# Patient Record
Sex: FEMALE | Race: WHITE | URBAN - METROPOLITAN AREA
[De-identification: names, ages, dates, MRNs, and addresses within clinical notes are randomized per-mention and may not be internally consistent; named-entity substitution may affect disease eponyms.]

---

## 2023-08-21 LAB — PAP SMEAR, EXTERNAL: NORMAL

## 2023-10-05 ENCOUNTER — OFFICE VISIT (OUTPATIENT)
Dept: INTERNAL MEDICINE CLINIC | Age: 33
End: 2023-10-05
Payer: COMMERCIAL

## 2023-10-05 VITALS
BODY MASS INDEX: 45.08 KG/M2 | HEIGHT: 62 IN | SYSTOLIC BLOOD PRESSURE: 128 MMHG | WEIGHT: 245 LBS | OXYGEN SATURATION: 97 % | HEART RATE: 89 BPM | DIASTOLIC BLOOD PRESSURE: 84 MMHG

## 2023-10-05 DIAGNOSIS — Z23 NEED FOR DIPHTHERIA-TETANUS-PERTUSSIS (TDAP) VACCINE: ICD-10-CM

## 2023-10-05 DIAGNOSIS — R87.610 ASCUS WITH POSITIVE HIGH RISK HPV CERVICAL: ICD-10-CM

## 2023-10-05 DIAGNOSIS — J45.30 MILD PERSISTENT ASTHMA WITHOUT COMPLICATION: ICD-10-CM

## 2023-10-05 DIAGNOSIS — Z00.00 ANNUAL PHYSICAL EXAM: Primary | ICD-10-CM

## 2023-10-05 DIAGNOSIS — R87.810 ASCUS WITH POSITIVE HIGH RISK HPV CERVICAL: ICD-10-CM

## 2023-10-05 PROBLEM — Z87.42 HISTORY OF ABNORMAL CERVICAL PAP SMEAR: Status: RESOLVED | Noted: 2023-10-05 | Resolved: 2023-10-05

## 2023-10-05 PROBLEM — N87.9 CERVICAL DYSPLASIA: Status: RESOLVED | Noted: 2023-10-05 | Resolved: 2023-10-05

## 2023-10-05 PROBLEM — N87.9 CERVICAL DYSPLASIA: Status: ACTIVE | Noted: 2023-10-05

## 2023-10-05 PROBLEM — Z87.42 HISTORY OF ABNORMAL CERVICAL PAP SMEAR: Status: ACTIVE | Noted: 2023-10-05

## 2023-10-05 PROCEDURE — 99385 PREV VISIT NEW AGE 18-39: CPT | Performed by: INTERNAL MEDICINE

## 2023-10-05 PROCEDURE — 90715 TDAP VACCINE 7 YRS/> IM: CPT | Performed by: INTERNAL MEDICINE

## 2023-10-05 PROCEDURE — 90471 IMMUNIZATION ADMIN: CPT | Performed by: INTERNAL MEDICINE

## 2023-10-05 RX ORDER — ALBUTEROL SULFATE 90 UG/1
2 AEROSOL, METERED RESPIRATORY (INHALATION) 4 TIMES DAILY PRN
Qty: 18 G | Refills: 3 | Status: SHIPPED | OUTPATIENT
Start: 2023-10-05

## 2023-10-05 RX ORDER — NORGESTIMATE AND ETHINYL ESTRADIOL 0.25-0.035
KIT ORAL
COMMUNITY
Start: 2023-10-01

## 2023-10-05 SDOH — ECONOMIC STABILITY: INCOME INSECURITY: HOW HARD IS IT FOR YOU TO PAY FOR THE VERY BASICS LIKE FOOD, HOUSING, MEDICAL CARE, AND HEATING?: NOT HARD AT ALL

## 2023-10-05 SDOH — ECONOMIC STABILITY: HOUSING INSECURITY
IN THE LAST 12 MONTHS, WAS THERE A TIME WHEN YOU DID NOT HAVE A STEADY PLACE TO SLEEP OR SLEPT IN A SHELTER (INCLUDING NOW)?: NO

## 2023-10-05 SDOH — ECONOMIC STABILITY: FOOD INSECURITY: WITHIN THE PAST 12 MONTHS, THE FOOD YOU BOUGHT JUST DIDN'T LAST AND YOU DIDN'T HAVE MONEY TO GET MORE.: NEVER TRUE

## 2023-10-05 SDOH — ECONOMIC STABILITY: FOOD INSECURITY: WITHIN THE PAST 12 MONTHS, YOU WORRIED THAT YOUR FOOD WOULD RUN OUT BEFORE YOU GOT MONEY TO BUY MORE.: NEVER TRUE

## 2023-10-05 ASSESSMENT — ENCOUNTER SYMPTOMS
COUGH: 0
WHEEZING: 0
COLOR CHANGE: 0
ABDOMINAL PAIN: 0
CHEST TIGHTNESS: 0
NAUSEA: 0
SHORTNESS OF BREATH: 0
CONSTIPATION: 0
BACK PAIN: 0
VOMITING: 0
SORE THROAT: 0

## 2023-10-05 ASSESSMENT — PATIENT HEALTH QUESTIONNAIRE - PHQ9
SUM OF ALL RESPONSES TO PHQ QUESTIONS 1-9: 0
2. FEELING DOWN, DEPRESSED OR HOPELESS: 0
SUM OF ALL RESPONSES TO PHQ9 QUESTIONS 1 & 2: 0
1. LITTLE INTEREST OR PLEASURE IN DOING THINGS: 0

## 2023-10-05 NOTE — ASSESSMENT & PLAN NOTE
age-related health maintenance and immunization recommendations reviewed and made to patient, she will complete Tdap today, declined flu vaccine  Labs ordered, healthy diet and regular exercise recommendations made to patient  Patient up to date with GYN exam, she is on OCP with regular menstrual cycles, she will follow-up on ASCUS  Depression screen is negative  Follow-up in 1 year and as needed

## 2023-10-05 NOTE — PROGRESS NOTES
ASSESSMENT/PLAN:  1. Annual physical exam  Assessment & Plan:    age-related health maintenance and immunization recommendations reviewed and made to patient, she will complete Tdap today, declined flu vaccine  Labs ordered, healthy diet and regular exercise recommendations made to patient  Patient up to date with GYN exam, she is on OCP with regular menstrual cycles, she will follow-up on ASCUS  Depression screen is negative  Follow-up in 1 year and as needed  Orders:  -     CBC; Future  -     Comprehensive Metabolic Panel; Future  -     Hemoglobin A1C; Future  -     Lipid Panel; Future  -     TSH with Reflex to FT4; Future  -     Tdap, BOOSTRIX, (age 8 yrs+), IM  2. ASCUS with positive high risk HPV cervical  Assessment & Plan:    patient has been following up with GYN and expected to have another colposcopy in the near future. Visits view[facility] through epic, [facility]  3. BMI 40.0-44.9, adult [facility])  Assessment & Plan:    healthy diet, regular exercise and active life recommendations made to patient  4. Need for diphtheria-tetanus-pertussis (Tdap) vaccine  -     Tdap, BOOSTRIX, (age 8 yrs+), IM  5. Mild persistent asthma without complication  Assessment & Plan:    stable and well-controlled on as needed albuterol, continue to avoid smoking known irritants, will refill and continue same. She declined flu vaccine for this visit  Orders:  -     albuterol sulfate HFA (VENTOLIN HFA) 108 (90 Base) MCG/ACT inhaler; Inhale 2 puffs into the lungs 4 times daily as needed for Wheezing, Disp-18 g, R-3Normal      Return in about 1 year (around 10/5/2024) for annual physical.     SUBJECTIVE  HPI:   Patient here to establish new PCP office visits, no previous PCP however she does follow-up with GYN at [facility].   She is a generally healthy 25-year-old female except for history of mild asthma and recently had abnormal Pap smear for which she had colposcopy followed by another test that was still abnormal and is

## 2023-10-05 NOTE — ASSESSMENT & PLAN NOTE
stable and well-controlled on as needed albuterol, continue to avoid smoking known irritants, will refill and continue same.   She declined flu vaccine for this visit

## 2023-10-05 NOTE — ASSESSMENT & PLAN NOTE
patient has been following up with GYN and expected to have another colposcopy in the near future.   Visits viewable through epic, GYN with [facility]

## 2023-10-10 DIAGNOSIS — Z00.00 ANNUAL PHYSICAL EXAM: ICD-10-CM

## 2023-10-10 LAB
ALBUMIN SERPL-MCNC: 4.3 G/DL (ref 3.4–5)
ALBUMIN/GLOB SERPL: 1.7 {RATIO} (ref 1.1–2.2)
ALP SERPL-CCNC: 103 U/L (ref 40–129)
ALT SERPL-CCNC: 16 U/L (ref 10–40)
ANION GAP SERPL CALCULATED.3IONS-SCNC: 12 MMOL/L (ref 3–16)
AST SERPL-CCNC: 16 U/L (ref 15–37)
BILIRUB SERPL-MCNC: 0.4 MG/DL (ref 0–1)
BUN SERPL-MCNC: 9 MG/DL (ref 7–20)
CALCIUM SERPL-MCNC: 9.1 MG/DL (ref 8.3–10.6)
CHLORIDE SERPL-SCNC: 103 MMOL/L (ref 99–110)
CHOLEST SERPL-MCNC: 301 MG/DL (ref 0–199)
CO2 SERPL-SCNC: 25 MMOL/L (ref 21–32)
CREAT SERPL-MCNC: 0.8 MG/DL (ref 0.6–1.1)
DEPRECATED RDW RBC AUTO: 12.6 % (ref 12.4–15.4)
GFR SERPLBLD CREATININE-BSD FMLA CKD-EPI: >60 ML/MIN/{1.73_M2}
GLUCOSE SERPL-MCNC: 100 MG/DL (ref 70–99)
HCT VFR BLD AUTO: 38.9 % (ref 36–48)
HDLC SERPL-MCNC: 58 MG/DL (ref 40–60)
HGB BLD-MCNC: 12.9 G/DL (ref 12–16)
LDLC SERPL CALC-MCNC: 213 MG/DL
MCH RBC QN AUTO: 29.1 PG (ref 26–34)
MCHC RBC AUTO-ENTMCNC: 33.2 G/DL (ref 31–36)
MCV RBC AUTO: 87.5 FL (ref 80–100)
PLATELET # BLD AUTO: 257 K/UL (ref 135–450)
PMV BLD AUTO: 8.7 FL (ref 5–10.5)
POTASSIUM SERPL-SCNC: 4.3 MMOL/L (ref 3.5–5.1)
PROT SERPL-MCNC: 6.9 G/DL (ref 6.4–8.2)
RBC # BLD AUTO: 4.45 M/UL (ref 4–5.2)
SODIUM SERPL-SCNC: 140 MMOL/L (ref 136–145)
TRIGL SERPL-MCNC: 148 MG/DL (ref 0–150)
TSH SERPL DL<=0.005 MIU/L-ACNC: 0.79 UIU/ML (ref 0.27–4.2)
VLDLC SERPL CALC-MCNC: 30 MG/DL
WBC # BLD AUTO: 6.2 K/UL (ref 4–11)

## 2023-10-11 LAB
EST. AVERAGE GLUCOSE BLD GHB EST-MCNC: 105.4 MG/DL
HBA1C MFR BLD: 5.3 %

## 2023-11-04 PROBLEM — Z00.00 ANNUAL PHYSICAL EXAM: Status: RESOLVED | Noted: 2023-10-05 | Resolved: 2023-11-04

## 2023-12-08 ENCOUNTER — TELEMEDICINE (OUTPATIENT)
Dept: INTERNAL MEDICINE CLINIC | Age: 33
End: 2023-12-08

## 2023-12-08 DIAGNOSIS — J02.9 SORE THROAT: ICD-10-CM

## 2023-12-08 DIAGNOSIS — R09.81 NASAL CONGESTION: ICD-10-CM

## 2023-12-08 DIAGNOSIS — R05.1 ACUTE COUGH: Primary | ICD-10-CM

## 2023-12-08 LAB
INFLUENZA A ANTIBODY: NORMAL
INFLUENZA B ANTIBODY: NORMAL
S PYO AG THROAT QL: NORMAL

## 2023-12-08 RX ORDER — METHYLPREDNISOLONE 4 MG/1
TABLET ORAL
Qty: 1 KIT | Refills: 0 | Status: SHIPPED | OUTPATIENT
Start: 2023-12-08 | End: 2023-12-14

## 2023-12-08 ASSESSMENT — ENCOUNTER SYMPTOMS
WHEEZING: 1
SHORTNESS OF BREATH: 0
COUGH: 1
ABDOMINAL PAIN: 0
RHINORRHEA: 1
CONSTIPATION: 0
VOMITING: 0
SINUS PRESSURE: 1
SORE THROAT: 1
TROUBLE SWALLOWING: 0
NAUSEA: 0
DIARRHEA: 0

## 2023-12-08 NOTE — PROGRESS NOTES
TELEHEALTH EVALUATION -- Audio/Visual  Acute Office Visit  12/8/2023    SUBJECTIVE:  Patient ID: Arian Chaves is a 26 y.o. female. Chief Complaint   Patient presents with    Cough     x3d    Headache    Pharyngitis     HPI: The patient presents for an acute visit. Pt reports a cough, wheezing, nasal congestion/drainage, headache and sore throat for the last 3 days. States she took a negative home COVID-19 test.  Denies fevers or chills. Denies N/V, diarrhea or abdominal pain. Denies CP or SOB. History of asthma - states her chest gets tight when she is coughing a lot. Treatment tried and response - inhaler   Recent ill contacts? 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 Tobacco use or second hand smoke exposure - no    No Known Allergies    Current Outpatient Medications   Medication Sig Dispense Refill    methylPREDNISolone (MEDROL DOSEPACK) 4 MG tablet Take by mouth. 1 kit 0    YVES 0.25-35 MG-MCG per tablet       albuterol sulfate HFA (VENTOLIN HFA) 108 (90 Base) MCG/ACT inhaler Inhale 2 puffs into the lungs 4 times daily as needed for Wheezing 18 g 3     No current facility-administered medications for this visit. Review of Systems   Constitutional:  Negative for chills, fatigue and fever. HENT:  Positive for congestion, rhinorrhea, sinus pressure and sore throat. Negative for ear pain, postnasal drip, sneezing and trouble swallowing. Respiratory:  Positive for cough and wheezing. Negative for shortness of breath. Cardiovascular:  Negative for chest pain and palpitations. Gastrointestinal:  Negative for abdominal pain, constipation, diarrhea, nausea and vomiting. Skin:  Negative for pallor. Neurological:  Positive for headaches. Negative for dizziness and light-headedness.      OBJECTIVE:  Video Virtual Visit      12/8/2023    11:41 AM   Patient-Reported Vitals   Patient-Reported Weight 135   Patient-Reported Height 5'2   ^no access to other VS d/t VV per

## 2023-12-09 LAB — SARS-COV-2 N GENE RESP QL NAA+PROBE: NOT DETECTED

## 2024-02-22 DIAGNOSIS — J45.30 MILD PERSISTENT ASTHMA WITHOUT COMPLICATION: ICD-10-CM

## 2024-02-23 RX ORDER — ALBUTEROL SULFATE 90 UG/1
AEROSOL, METERED RESPIRATORY (INHALATION)
Qty: 6.7 G | Refills: 1 | Status: SHIPPED | OUTPATIENT
Start: 2024-02-23

## 2024-02-23 NOTE — TELEPHONE ENCOUNTER
Last OV: 12/8/2023  Next OV: Visit date not found    Next appointment due:around 10/5/2024     Last fill:10/5/23  Refills:3

## 2024-04-21 DIAGNOSIS — J45.30 MILD PERSISTENT ASTHMA WITHOUT COMPLICATION: ICD-10-CM

## 2024-04-22 RX ORDER — ALBUTEROL SULFATE 90 UG/1
AEROSOL, METERED RESPIRATORY (INHALATION)
Qty: 6.7 G | Refills: 1 | Status: SHIPPED | OUTPATIENT
Start: 2024-04-22

## 2024-08-08 ENCOUNTER — OFFICE VISIT (OUTPATIENT)
Dept: INTERNAL MEDICINE CLINIC | Age: 34
End: 2024-08-08
Payer: COMMERCIAL

## 2024-08-08 VITALS
WEIGHT: 245 LBS | DIASTOLIC BLOOD PRESSURE: 85 MMHG | SYSTOLIC BLOOD PRESSURE: 135 MMHG | HEART RATE: 88 BPM | OXYGEN SATURATION: 96 % | BODY MASS INDEX: 44.81 KG/M2

## 2024-08-08 DIAGNOSIS — R03.0 ELEVATED BLOOD PRESSURE READING WITHOUT DIAGNOSIS OF HYPERTENSION: Primary | ICD-10-CM

## 2024-08-08 PROCEDURE — 99213 OFFICE O/P EST LOW 20 MIN: CPT | Performed by: INTERNAL MEDICINE

## 2024-08-08 ASSESSMENT — PATIENT HEALTH QUESTIONNAIRE - PHQ9
SUM OF ALL RESPONSES TO PHQ QUESTIONS 1-9: 0
1. LITTLE INTEREST OR PLEASURE IN DOING THINGS: NOT AT ALL
SUM OF ALL RESPONSES TO PHQ9 QUESTIONS 1 & 2: 0
SUM OF ALL RESPONSES TO PHQ QUESTIONS 1-9: 0
SUM OF ALL RESPONSES TO PHQ QUESTIONS 1-9: 0
2. FEELING DOWN, DEPRESSED OR HOPELESS: NOT AT ALL
SUM OF ALL RESPONSES TO PHQ QUESTIONS 1-9: 0

## 2024-08-08 ASSESSMENT — ENCOUNTER SYMPTOMS
COLOR CHANGE: 0
SHORTNESS OF BREATH: 0
BACK PAIN: 0
CONSTIPATION: 0
CHEST TIGHTNESS: 0
WHEEZING: 0
ABDOMINAL PAIN: 0
COUGH: 0
SORE THROAT: 0
NAUSEA: 0
VOMITING: 0

## 2024-08-08 NOTE — PROGRESS NOTES
throat.    Respiratory:  Negative for cough, chest tightness, shortness of breath and wheezing.    Cardiovascular:  Negative for chest pain, palpitations and leg swelling.   Gastrointestinal:  Negative for abdominal pain, constipation, nausea and vomiting.   Genitourinary:  Negative for difficulty urinating, dysuria, frequency, hematuria and urgency.   Musculoskeletal:  Negative for arthralgias, back pain, gait problem and joint swelling.   Skin:  Negative for color change.   Allergic/Immunologic: Negative for environmental allergies and immunocompromised state.   Neurological:  Negative for dizziness, light-headedness and headaches.   Psychiatric/Behavioral:  Negative for behavioral problems and dysphoric mood.        OBJECTIVE:    /85   Pulse 88   Wt 111.1 kg (245 lb)   SpO2 96%   BMI 44.81 kg/m²    Physical Exam  Vitals and nursing note reviewed.   Constitutional:       General: She is not in acute distress.     Appearance: She is obese. She is not toxic-appearing.   HENT:      Head: Normocephalic.   Cardiovascular:      Rate and Rhythm: Normal rate and regular rhythm.      Heart sounds: Normal heart sounds. No murmur heard.  Pulmonary:      Effort: Pulmonary effort is normal. No respiratory distress.      Breath sounds: No wheezing.   Musculoskeletal:      Cervical back: Normal range of motion and neck supple.      Right lower leg: No edema.      Left lower leg: No edema.   Skin:     General: Skin is warm and dry.   Neurological:      General: No focal deficit present.      Mental Status: She is alert.   Psychiatric:         Mood and Affect: Mood normal.         Behavior: Behavior normal.           Electronically signed by Lexi Moura MD on 8/8/2024 at 9:03 AM.    This dictation was generated by voice recognition computer software.  Although all attempts are made to edit the dictation for accuracy, there may be errors in the transcription that are not intended.

## 2024-08-08 NOTE — ASSESSMENT & PLAN NOTE
recheck this morning upper normal however her home readings are higher.  Advised patient since readings are borderline and not significantly elevated at this point we will hold on starting antihypertensive therapy and observe for another 2 weeks as she just recently discontinued the weight loss medication.  Discussed with patient at length recommendations for lifestyle modification changes to help control blood pressure, encouraged salt restriction, healthy diet and active lifestyle, recommended 30 minutes of cardio at least 3 times a week, continue abstinence from smoking alcohol and reduce caffeine intake, avoid stimulants as energy drinks or weight loss medications.  Patient voiced understanding, will continue ambulatory blood pressure checks for the next 2 weeks and send the readings through [facility], if remains elevated then will probably start antihypertensive therapy, she is scheduled to have her complete physical in the near future

## 2024-08-30 ENCOUNTER — PATIENT MESSAGE (OUTPATIENT)
Dept: INTERNAL MEDICINE CLINIC | Age: 34
End: 2024-08-30

## 2024-08-30 DIAGNOSIS — I10 PRIMARY HYPERTENSION: Primary | ICD-10-CM

## 2024-09-03 RX ORDER — LISINOPRIL 10 MG/1
10 TABLET ORAL DAILY
Qty: 90 TABLET | Refills: 1 | Status: SHIPPED | OUTPATIENT
Start: 2024-09-03

## 2024-09-03 NOTE — TELEPHONE ENCOUNTER
Blood pressure readings remain borderline elevated specially for diastolic higher than target goal for her age, will start lisinopril 10 mg daily, keep upcoming appointment in 4 weeks to update labs and follow-up on blood pressure reading following starting medication.  Order sent to her pharmacy

## 2024-10-07 ENCOUNTER — OFFICE VISIT (OUTPATIENT)
Dept: INTERNAL MEDICINE CLINIC | Age: 34
End: 2024-10-07
Payer: COMMERCIAL

## 2024-10-07 VITALS
DIASTOLIC BLOOD PRESSURE: 80 MMHG | HEART RATE: 95 BPM | HEIGHT: 62 IN | WEIGHT: 245.4 LBS | SYSTOLIC BLOOD PRESSURE: 120 MMHG | OXYGEN SATURATION: 99 % | BODY MASS INDEX: 45.16 KG/M2

## 2024-10-07 DIAGNOSIS — Z23 NEED FOR PNEUMOCOCCAL VACCINATION: ICD-10-CM

## 2024-10-07 DIAGNOSIS — Z00.00 ANNUAL PHYSICAL EXAM: Primary | ICD-10-CM

## 2024-10-07 DIAGNOSIS — I10 PRIMARY HYPERTENSION: ICD-10-CM

## 2024-10-07 DIAGNOSIS — R87.610 ASCUS WITH POSITIVE HIGH RISK HPV CERVICAL: ICD-10-CM

## 2024-10-07 DIAGNOSIS — J45.30 MILD PERSISTENT ASTHMA WITHOUT COMPLICATION: ICD-10-CM

## 2024-10-07 DIAGNOSIS — R87.810 ASCUS WITH POSITIVE HIGH RISK HPV CERVICAL: ICD-10-CM

## 2024-10-07 PROBLEM — R03.0 ELEVATED BLOOD PRESSURE READING WITHOUT DIAGNOSIS OF HYPERTENSION: Status: RESOLVED | Noted: 2024-08-08 | Resolved: 2024-10-07

## 2024-10-07 PROCEDURE — G8484 FLU IMMUNIZE NO ADMIN: HCPCS | Performed by: INTERNAL MEDICINE

## 2024-10-07 PROCEDURE — G8417 CALC BMI ABV UP PARAM F/U: HCPCS | Performed by: INTERNAL MEDICINE

## 2024-10-07 PROCEDURE — 99395 PREV VISIT EST AGE 18-39: CPT | Performed by: INTERNAL MEDICINE

## 2024-10-07 PROCEDURE — 1036F TOBACCO NON-USER: CPT | Performed by: INTERNAL MEDICINE

## 2024-10-07 PROCEDURE — 90471 IMMUNIZATION ADMIN: CPT | Performed by: INTERNAL MEDICINE

## 2024-10-07 PROCEDURE — 99214 OFFICE O/P EST MOD 30 MIN: CPT | Performed by: INTERNAL MEDICINE

## 2024-10-07 PROCEDURE — 3074F SYST BP LT 130 MM HG: CPT | Performed by: INTERNAL MEDICINE

## 2024-10-07 PROCEDURE — 3079F DIAST BP 80-89 MM HG: CPT | Performed by: INTERNAL MEDICINE

## 2024-10-07 PROCEDURE — G8427 DOCREV CUR MEDS BY ELIG CLIN: HCPCS | Performed by: INTERNAL MEDICINE

## 2024-10-07 PROCEDURE — 90677 PCV20 VACCINE IM: CPT | Performed by: INTERNAL MEDICINE

## 2024-10-07 RX ORDER — LISINOPRIL 10 MG/1
10 TABLET ORAL DAILY
Qty: 90 TABLET | Refills: 1 | Status: SHIPPED | OUTPATIENT
Start: 2024-10-07

## 2024-10-07 SDOH — ECONOMIC STABILITY: FOOD INSECURITY: WITHIN THE PAST 12 MONTHS, YOU WORRIED THAT YOUR FOOD WOULD RUN OUT BEFORE YOU GOT MONEY TO BUY MORE.: NEVER TRUE

## 2024-10-07 SDOH — ECONOMIC STABILITY: FOOD INSECURITY: WITHIN THE PAST 12 MONTHS, THE FOOD YOU BOUGHT JUST DIDN'T LAST AND YOU DIDN'T HAVE MONEY TO GET MORE.: NEVER TRUE

## 2024-10-07 SDOH — ECONOMIC STABILITY: INCOME INSECURITY: HOW HARD IS IT FOR YOU TO PAY FOR THE VERY BASICS LIKE FOOD, HOUSING, MEDICAL CARE, AND HEATING?: NOT HARD AT ALL

## 2024-10-07 ASSESSMENT — ENCOUNTER SYMPTOMS
CONSTIPATION: 0
CHEST TIGHTNESS: 0
SORE THROAT: 0
ABDOMINAL PAIN: 0
SINUS PRESSURE: 0
WHEEZING: 1
COUGH: 0
COLOR CHANGE: 0
NAUSEA: 0
BACK PAIN: 0
SHORTNESS OF BREATH: 0
TROUBLE SWALLOWING: 0
VOMITING: 0

## 2024-10-07 NOTE — ASSESSMENT & PLAN NOTE
Patient has prior history of abnormal Pap smears, both mother and sister were diagnosed with cervical cancer, she is scheduled for colposcopy tomorrow, she will continue care and recommendation as per gynecology

## 2024-10-07 NOTE — ASSESSMENT & PLAN NOTE
blood pressure is under control on current dose of lisinopril, advised can go ahead and restart Adipex as long as monitoring of blood pressure.  Reinforced recommendations to continue attempts for healthy low-fat low-carb diet and active lifestyle

## 2024-10-07 NOTE — ASSESSMENT & PLAN NOTE
Although mostly stable has been needing albuterol more frequently lately with the weather changing, she just started daily cetirizine this week, advised if still needing albuterol frequently despite adding daily antihistamine then will need to either try Singulair or start maintenance therapy for the next 6 to 8 weeks with Symbicort or Advair.  Encouraged to reconsider completing flu vaccine, she will complete pneumonia shot this visit, continue to avoid smoking known irritants

## 2024-10-07 NOTE — ASSESSMENT & PLAN NOTE
Blood pressure stable and well-controlled on current dose of lisinopril, she is reporting some fatigue and tiredness since started the medicine however has also been sedentary and deconditioned.  Will update labs today, encouraged to ensure adequate hydration, start gradually increasing level of physical activity until preferably reaches a goal of cardio 30 minutes 3 times a week.  Reevaluate in 3 to 6 months

## 2024-10-07 NOTE — ASSESSMENT & PLAN NOTE
age-related health maintenance and immunization recommendations reviewed and discussed with patient.  She will update pneumonia vaccine today and will consider flu vaccine  Labs ordered, healthy diet and regular exercise recommendations made to patient  Continue abstinence from smoking, alcohol, recreational drug use  Patient up-to-date with GYN exam, she is scheduled for colposcopy tomorrow, see below.  Menstrual cycles regular on OCP  Depression screen is negative

## 2024-10-07 NOTE — PROGRESS NOTES
ASSESSMENT/PLAN:  1. Annual physical exam  Assessment & Plan:    age-related health maintenance and immunization recommendations reviewed and discussed with patient.  She will update pneumonia vaccine today and will consider flu vaccine  Labs ordered, healthy diet and regular exercise recommendations made to patient  Continue abstinence from smoking, alcohol, recreational drug use  Patient up-to-date with GYN exam, she is scheduled for colposcopy tomorrow, see below.  Menstrual cycles regular on OCP  Depression screen is negative  Orders:  -     CBC; Future  -     Comprehensive Metabolic Panel; Future  -     Hemoglobin A1C; Future  -     Lipid Panel; Future  -     TSH with Reflex to FT4; Future  2. Primary hypertension  Assessment & Plan:  Blood pressure stable and well-controlled on current dose of lisinopril, she is reporting some fatigue and tiredness since started the medicine however has also been sedentary and deconditioned.  Will update labs today, encouraged to ensure adequate hydration, start gradually increasing level of physical activity until preferably reaches a goal of cardio 30 minutes 3 times a week.  Reevaluate in 3 to 6 months   Orders:  -     lisinopril (PRINIVIL;ZESTRIL) 10 MG tablet; Take 1 tablet by mouth daily, Disp-90 tablet, R-1Normal  3. Mild persistent asthma without complication  Assessment & Plan:  Although mostly stable has been needing albuterol more frequently lately with the weather changing, she just started daily cetirizine this week, advised if still needing albuterol frequently despite adding daily antihistamine then will need to either try Singulair or start maintenance therapy for the next 6 to 8 weeks with Symbicort or Advair.  Encouraged to reconsider completing flu vaccine, she will complete pneumonia shot this visit, continue to avoid smoking known irritants   Orders:  -     Pneumococcal, PCV20, PREVNAR 20, (age 6w+), IM, PF  4. BMI 40.0-44.9, adult  Assessment & Plan:

## 2024-10-10 DIAGNOSIS — Z00.00 ANNUAL PHYSICAL EXAM: ICD-10-CM

## 2024-10-10 LAB
DEPRECATED RDW RBC AUTO: 13.2 % (ref 12.4–15.4)
HCT VFR BLD AUTO: 37 % (ref 36–48)
HGB BLD-MCNC: 12.4 G/DL (ref 12–16)
MCH RBC QN AUTO: 29.8 PG (ref 26–34)
MCHC RBC AUTO-ENTMCNC: 33.5 G/DL (ref 31–36)
MCV RBC AUTO: 89 FL (ref 80–100)
PLATELET # BLD AUTO: 272 K/UL (ref 135–450)
PMV BLD AUTO: 8.4 FL (ref 5–10.5)
RBC # BLD AUTO: 4.16 M/UL (ref 4–5.2)
WBC # BLD AUTO: 6.6 K/UL (ref 4–11)

## 2024-10-11 LAB
ALBUMIN SERPL-MCNC: 4.1 G/DL (ref 3.4–5)
ALBUMIN/GLOB SERPL: 1.6 {RATIO} (ref 1.1–2.2)
ALP SERPL-CCNC: 90 U/L (ref 40–129)
ALT SERPL-CCNC: 16 U/L (ref 10–40)
ANION GAP SERPL CALCULATED.3IONS-SCNC: 9 MMOL/L (ref 3–16)
AST SERPL-CCNC: 13 U/L (ref 15–37)
BILIRUB SERPL-MCNC: <0.2 MG/DL (ref 0–1)
BUN SERPL-MCNC: 10 MG/DL (ref 7–20)
CALCIUM SERPL-MCNC: 9 MG/DL (ref 8.3–10.6)
CHLORIDE SERPL-SCNC: 103 MMOL/L (ref 99–110)
CHOLEST SERPL-MCNC: 294 MG/DL (ref 0–199)
CO2 SERPL-SCNC: 25 MMOL/L (ref 21–32)
CREAT SERPL-MCNC: 0.7 MG/DL (ref 0.6–1.1)
EST. AVERAGE GLUCOSE BLD GHB EST-MCNC: 108.3 MG/DL
GFR SERPLBLD CREATININE-BSD FMLA CKD-EPI: >90 ML/MIN/{1.73_M2}
GLUCOSE SERPL-MCNC: 94 MG/DL (ref 70–99)
HBA1C MFR BLD: 5.4 %
HDLC SERPL-MCNC: 62 MG/DL (ref 40–60)
LDLC SERPL CALC-MCNC: 190 MG/DL
POTASSIUM SERPL-SCNC: 4.2 MMOL/L (ref 3.5–5.1)
PROT SERPL-MCNC: 6.7 G/DL (ref 6.4–8.2)
SODIUM SERPL-SCNC: 137 MMOL/L (ref 136–145)
TRIGL SERPL-MCNC: 210 MG/DL (ref 0–150)
TSH SERPL DL<=0.005 MIU/L-ACNC: 0.65 UIU/ML (ref 0.27–4.2)
VLDLC SERPL CALC-MCNC: 42 MG/DL

## 2024-10-15 ENCOUNTER — PATIENT MESSAGE (OUTPATIENT)
Dept: INTERNAL MEDICINE CLINIC | Age: 34
End: 2024-10-15

## 2024-10-15 DIAGNOSIS — E78.2 MIXED HYPERLIPIDEMIA: Primary | ICD-10-CM

## 2024-10-15 RX ORDER — ATORVASTATIN CALCIUM 10 MG/1
10 TABLET, FILM COATED ORAL DAILY
Qty: 90 TABLET | Refills: 1 | Status: SHIPPED | OUTPATIENT
Start: 2024-10-15

## 2024-10-29 ENCOUNTER — PATIENT MESSAGE (OUTPATIENT)
Dept: INTERNAL MEDICINE CLINIC | Age: 34
End: 2024-10-29

## 2024-10-31 ENCOUNTER — OFFICE VISIT (OUTPATIENT)
Dept: INTERNAL MEDICINE CLINIC | Age: 34
End: 2024-10-31

## 2024-10-31 VITALS
OXYGEN SATURATION: 98 % | BODY MASS INDEX: 45.34 KG/M2 | WEIGHT: 246.4 LBS | DIASTOLIC BLOOD PRESSURE: 74 MMHG | SYSTOLIC BLOOD PRESSURE: 122 MMHG | HEART RATE: 81 BPM | HEIGHT: 62 IN

## 2024-10-31 DIAGNOSIS — R87.610 ASCUS WITH POSITIVE HIGH RISK HPV CERVICAL: ICD-10-CM

## 2024-10-31 DIAGNOSIS — Z01.818 PRE-OP EVALUATION: ICD-10-CM

## 2024-10-31 DIAGNOSIS — N87.1 CERVICAL DYSPLASIA, MODERATE: ICD-10-CM

## 2024-10-31 DIAGNOSIS — N87.1 CERVICAL DYSPLASIA, MODERATE: Primary | ICD-10-CM

## 2024-10-31 DIAGNOSIS — J45.30 MILD PERSISTENT ASTHMA WITHOUT COMPLICATION: ICD-10-CM

## 2024-10-31 DIAGNOSIS — R87.810 ASCUS WITH POSITIVE HIGH RISK HPV CERVICAL: ICD-10-CM

## 2024-10-31 DIAGNOSIS — I10 PRIMARY HYPERTENSION: ICD-10-CM

## 2024-10-31 LAB
ABO + RH BLD: NORMAL
APTT BLD: 25 SEC (ref 22.1–36.4)
BILIRUB UR QL STRIP.AUTO: NEGATIVE
BLD GP AB SCN SERPL QL: NORMAL
CLARITY UR: CLEAR
COLOR UR: YELLOW
GLUCOSE UR STRIP.AUTO-MCNC: NEGATIVE MG/DL
HGB UR QL STRIP.AUTO: NEGATIVE
INR PPP: 0.97 (ref 0.85–1.15)
KETONES UR STRIP.AUTO-MCNC: NEGATIVE MG/DL
LEUKOCYTE ESTERASE UR QL STRIP.AUTO: NEGATIVE
NITRITE UR QL STRIP.AUTO: NEGATIVE
PH UR STRIP.AUTO: 6 [PH] (ref 5–8)
PROT UR STRIP.AUTO-MCNC: NEGATIVE MG/DL
PROTHROMBIN TIME: 13.1 SEC (ref 11.9–14.9)
SP GR UR STRIP.AUTO: 1.03 (ref 1–1.03)
UA COMPLETE W REFLEX CULTURE PNL UR: NORMAL
UA DIPSTICK W REFLEX MICRO PNL UR: NORMAL
URN SPEC COLLECT METH UR: NORMAL
UROBILINOGEN UR STRIP-ACNC: 0.2 E.U./DL

## 2024-10-31 RX ORDER — CETIRIZINE HYDROCHLORIDE 10 MG/1
10 TABLET ORAL DAILY
COMMUNITY

## 2024-10-31 NOTE — PROGRESS NOTES
Preoperative Consultation      Kelly Brunner  YOB: 1990    Date of Service:  10/31/2024    Vitals:    10/31/24 0938   BP: 122/74   Pulse: 81   SpO2: 98%   Weight: 111.8 kg (246 lb 6.4 oz)   Height: 1.575 m (5' 2\")      Wt Readings from Last 2 Encounters:   10/31/24 111.8 kg (246 lb 6.4 oz)   10/07/24 111.3 kg (245 lb 6.4 oz)     BP Readings from Last 3 Encounters:   10/31/24 122/74   10/07/24 120/80   08/08/24 135/85        Chief Complaint   Patient presents with    Pre-op Exam     Pt is here for a pre op examination for Loop Electrosurgical Excision Procedure on 11/5/24 with Dr. Nath at [facility].      No Known Allergies  Outpatient Medications Marked as Taking for the 10/31/24 encounter (Office Visit) with SHEILA Carrera MD   Medication Sig Dispense Refill    cetirizine (ZYRTEC) 10 MG tablet Take 1 tablet by mouth daily      atorvastatin (LIPITOR) 10 MG tablet Take 1 tablet by mouth daily 90 tablet 1    lisinopril (PRINIVIL;ZESTRIL) 10 MG tablet Take 1 tablet by mouth daily 90 tablet 1    albuterol sulfate HFA (PROVENTIL;VENTOLIN;PROAIR) 108 (90 Base) MCG/ACT inhaler INHALE 2 PUFFS INTO THE LUNGS FOUR TIMES DAILY AS NEEDED FOR WHEEZING 6.7 g 1    YVES 0.25-35 MG-MCG per tablet          This patient presents to the office today for a preoperative consultation at the request of surgeon, Dr.Nikki Nath, who plans on performing LEEP on November 5 at [facility].  The current problem began 2 years ago, and symptoms have been worsening with time.  Conservative therapy: N/A.    Planned anesthesia: General   Known anesthesia problems: None   Bleeding risk: patient report history excessive bleeding during vaginal delivery  Personal or FH of DVT/PE: No    Patient objection to receiving blood products: No  Current Functional Status: METS 3-5  Patient Active Problem List   Diagnosis    ASCUS with positive high risk HPV cervical    BMI 40.0-44.9, adult    Mild persistent asthma without

## 2024-11-06 PROBLEM — Z00.00 ANNUAL PHYSICAL EXAM: Status: RESOLVED | Noted: 2024-10-07 | Resolved: 2024-11-06

## 2025-02-28 ENCOUNTER — TELEMEDICINE (OUTPATIENT)
Dept: INTERNAL MEDICINE CLINIC | Age: 35
End: 2025-02-28
Payer: COMMERCIAL

## 2025-02-28 DIAGNOSIS — J10.1 INFLUENZA A: Primary | ICD-10-CM

## 2025-02-28 DIAGNOSIS — R05.1 ACUTE COUGH: ICD-10-CM

## 2025-02-28 PROCEDURE — G8427 DOCREV CUR MEDS BY ELIG CLIN: HCPCS | Performed by: NURSE PRACTITIONER

## 2025-02-28 PROCEDURE — 1036F TOBACCO NON-USER: CPT | Performed by: NURSE PRACTITIONER

## 2025-02-28 PROCEDURE — G8417 CALC BMI ABV UP PARAM F/U: HCPCS | Performed by: NURSE PRACTITIONER

## 2025-02-28 PROCEDURE — 99213 OFFICE O/P EST LOW 20 MIN: CPT | Performed by: NURSE PRACTITIONER

## 2025-02-28 RX ORDER — BENZONATATE 200 MG/1
200 CAPSULE ORAL 3 TIMES DAILY PRN
Qty: 30 CAPSULE | Refills: 0 | Status: SHIPPED | OUTPATIENT
Start: 2025-02-28 | End: 2025-03-10

## 2025-02-28 RX ORDER — PHENTERMINE HYDROCHLORIDE 37.5 MG/1
37.5 TABLET ORAL
COMMUNITY

## 2025-02-28 RX ORDER — OSELTAMIVIR PHOSPHATE 75 MG/1
75 CAPSULE ORAL 2 TIMES DAILY
Qty: 10 CAPSULE | Refills: 0 | Status: SHIPPED | OUTPATIENT
Start: 2025-02-28 | End: 2025-03-05

## 2025-02-28 SDOH — ECONOMIC STABILITY: FOOD INSECURITY: WITHIN THE PAST 12 MONTHS, THE FOOD YOU BOUGHT JUST DIDN'T LAST AND YOU DIDN'T HAVE MONEY TO GET MORE.: NEVER TRUE

## 2025-02-28 SDOH — ECONOMIC STABILITY: FOOD INSECURITY: WITHIN THE PAST 12 MONTHS, YOU WORRIED THAT YOUR FOOD WOULD RUN OUT BEFORE YOU GOT MONEY TO BUY MORE.: NEVER TRUE

## 2025-02-28 ASSESSMENT — ENCOUNTER SYMPTOMS
COUGH: 1
SHORTNESS OF BREATH: 0
RHINORRHEA: 1
CHEST TIGHTNESS: 1
WHEEZING: 0

## 2025-02-28 ASSESSMENT — PATIENT HEALTH QUESTIONNAIRE - PHQ9
SUM OF ALL RESPONSES TO PHQ QUESTIONS 1-9: 0
SUM OF ALL RESPONSES TO PHQ9 QUESTIONS 1 & 2: 0
2. FEELING DOWN, DEPRESSED OR HOPELESS: NOT AT ALL
1. LITTLE INTEREST OR PLEASURE IN DOING THINGS: NOT AT ALL
SUM OF ALL RESPONSES TO PHQ QUESTIONS 1-9: 0

## 2025-02-28 NOTE — ASSESSMENT & PLAN NOTE
-   Acute, new problem  -   At home test positive for influenza A  -   Start Tamiflu as directed  -   Symptomatic care recommended

## 2025-02-28 NOTE — PROGRESS NOTES
TELEHEALTH EVALUATION -- Audio/Visual    Kelly Brunner, was evaluated through a synchronous (real-time) audio-video encounter. The patient (or guardian if applicable) is aware that this is a billable service, which includes applicable co-pays. This Virtual Visit was conducted with patient's (and/or legal guardian's) consent. Patient identification was verified, and a caregiver was present when appropriate.   The [place] was [place] [address] [place] [address]  Provider was located at Facility (Appt Dept): [address] [place] [id number]  Confirm you are appropriately licensed, registered, or certified to deliver care in the state where the patient is located as indicated above. If you are not or unsure, please re-schedule the visit: Yes, I confirm.      Total time spent for this encounter: Not billed by time    --MARK Walter - NP on 2/28/2025 at 8:59 AM    An electronic signature was used to authenticate this note.    Acute Office Visit  2/28/2025    SUBJECTIVE:    Patient ID: Kelly Brunner is a 34 y.o. female.    Chief Complaint   Patient presents with    Positive for Flu      Positive for flu this morning   Headache, chest congestion, cough, chills, fever of 99F (yesterday)x 1 day      HPI: The patient presents for an acute visit.    The patient is presenting with headache, chest congestion, sore throat and cough, fever started yesterday  At home test positive for influenza A today.  Treatment: Robitussin and Vitamin C    No Known Allergies  Current Outpatient Medications   Medication Sig Dispense Refill    phentermine (ADIPEX-P) 37.5 MG tablet Take 1 tablet by mouth every morning (before breakfast).      oseltamivir (TAMIFLU) 75 MG capsule Take 1 capsule by mouth 2 times daily for 5 days 10 capsule 0    benzonatate (TESSALON) 200 MG capsule Take 1 capsule by mouth 3 times daily as needed for Cough 30 capsule 0    cetirizine (ZYRTEC) 10 MG tablet Take 1 tablet by mouth

## 2025-03-16 ENCOUNTER — APPOINTMENT (OUTPATIENT)
Dept: CT IMAGING | Age: 35
DRG: 247 | End: 2025-03-16
Payer: COMMERCIAL

## 2025-03-16 ENCOUNTER — HOSPITAL ENCOUNTER (INPATIENT)
Age: 35
LOS: 3 days | Discharge: HOME | DRG: 247 | End: 2025-03-19
Attending: EMERGENCY MEDICINE | Admitting: HOSPITALIST
Payer: COMMERCIAL

## 2025-03-16 DIAGNOSIS — Z71.89 GOALS OF CARE, COUNSELING/DISCUSSION: ICD-10-CM

## 2025-03-16 DIAGNOSIS — R82.71 ASB (ASYMPTOMATIC BACTERIURIA): ICD-10-CM

## 2025-03-16 DIAGNOSIS — Z32.02 URINE PREGNANCY TEST NEGATIVE: ICD-10-CM

## 2025-03-16 DIAGNOSIS — K56.600 PARTIAL SMALL BOWEL OBSTRUCTION (HCC): Primary | ICD-10-CM

## 2025-03-16 PROBLEM — K56.609 SBO (SMALL BOWEL OBSTRUCTION) (HCC): Status: ACTIVE | Noted: 2025-03-16

## 2025-03-16 LAB
ALBUMIN SERPL-MCNC: 3.8 G/DL (ref 3.4–5)
ALP SERPL-CCNC: 136 U/L (ref 40–129)
ALT SERPL-CCNC: 15 U/L (ref 10–40)
ANION GAP SERPL CALCULATED.3IONS-SCNC: 11 MMOL/L (ref 3–16)
AST SERPL-CCNC: 17 U/L (ref 15–37)
BACTERIA URNS QL MICRO: ABNORMAL /HPF
BASOPHILS # BLD: 0.1 K/UL (ref 0–0.2)
BASOPHILS NFR BLD: 0.4 %
BILIRUB DIRECT SERPL-MCNC: 0.2 MG/DL (ref 0–0.3)
BILIRUB INDIRECT SERPL-MCNC: 0.4 MG/DL (ref 0–1)
BILIRUB SERPL-MCNC: 0.6 MG/DL (ref 0–1)
BILIRUB UR QL STRIP.AUTO: NEGATIVE
BUN SERPL-MCNC: 10 MG/DL (ref 7–20)
CALCIUM SERPL-MCNC: 8.8 MG/DL (ref 8.3–10.6)
CHLORIDE SERPL-SCNC: 103 MMOL/L (ref 99–110)
CLARITY UR: CLEAR
CO2 SERPL-SCNC: 26 MMOL/L (ref 21–32)
COLOR UR: ABNORMAL
CREAT SERPL-MCNC: 0.8 MG/DL (ref 0.6–1.1)
D-DIMER QUANTITATIVE: >20 UG/ML FEU (ref 0–0.6)
DEPRECATED RDW RBC AUTO: 12.7 % (ref 12.4–15.4)
EOSINOPHIL # BLD: 0.2 K/UL (ref 0–0.6)
EOSINOPHIL NFR BLD: 1.6 %
EPI CELLS #/AREA URNS AUTO: 12 /HPF (ref 0–5)
GFR SERPLBLD CREATININE-BSD FMLA CKD-EPI: >90 ML/MIN/{1.73_M2}
GLUCOSE SERPL-MCNC: 135 MG/DL (ref 70–99)
GLUCOSE UR STRIP.AUTO-MCNC: NEGATIVE MG/DL
HCG UR QL: NEGATIVE
HCT VFR BLD AUTO: 44.5 % (ref 36–48)
HGB BLD-MCNC: 14.6 G/DL (ref 12–16)
HGB UR QL STRIP.AUTO: NEGATIVE
HYALINE CASTS #/AREA URNS AUTO: 2 /LPF (ref 0–8)
KETONES UR STRIP.AUTO-MCNC: ABNORMAL MG/DL
LACTATE BLDV-SCNC: 1.3 MMOL/L (ref 0.4–2)
LEUKOCYTE ESTERASE UR QL STRIP.AUTO: ABNORMAL
LIPASE SERPL-CCNC: 19 U/L (ref 13–60)
LYMPHOCYTES # BLD: 2.4 K/UL (ref 1–5.1)
LYMPHOCYTES NFR BLD: 18 %
MCH RBC QN AUTO: 28.4 PG (ref 26–34)
MCHC RBC AUTO-ENTMCNC: 32.7 G/DL (ref 31–36)
MCV RBC AUTO: 87 FL (ref 80–100)
MONOCYTES # BLD: 0.5 K/UL (ref 0–1.3)
MONOCYTES NFR BLD: 3.7 %
NEUTROPHILS # BLD: 10.4 K/UL (ref 1.7–7.7)
NEUTROPHILS NFR BLD: 76.3 %
NITRITE UR QL STRIP.AUTO: NEGATIVE
PH UR STRIP.AUTO: 6 [PH] (ref 5–8)
PLATELET # BLD AUTO: 487 K/UL (ref 135–450)
PMV BLD AUTO: 8 FL (ref 5–10.5)
POTASSIUM SERPL-SCNC: 3.9 MMOL/L (ref 3.5–5.1)
PROT SERPL-MCNC: 6.9 G/DL (ref 6.4–8.2)
PROT UR STRIP.AUTO-MCNC: ABNORMAL MG/DL
RBC # BLD AUTO: 5.12 M/UL (ref 4–5.2)
RBC CLUMPS #/AREA URNS AUTO: 2 /HPF (ref 0–4)
SODIUM SERPL-SCNC: 140 MMOL/L (ref 136–145)
SP GR UR STRIP.AUTO: 1.03 (ref 1–1.03)
UA COMPLETE W REFLEX CULTURE PNL UR: ABNORMAL
UA DIPSTICK W REFLEX MICRO PNL UR: YES
URN SPEC COLLECT METH UR: ABNORMAL
UROBILINOGEN UR STRIP-ACNC: 1 E.U./DL
WBC # BLD AUTO: 13.6 K/UL (ref 4–11)
WBC #/AREA URNS AUTO: 9 /HPF (ref 0–5)

## 2025-03-16 PROCEDURE — 84703 CHORIONIC GONADOTROPIN ASSAY: CPT

## 2025-03-16 PROCEDURE — 80048 BASIC METABOLIC PNL TOTAL CA: CPT

## 2025-03-16 PROCEDURE — 70498 CT ANGIOGRAPHY NECK: CPT

## 2025-03-16 PROCEDURE — 71250 CT THORAX DX C-: CPT

## 2025-03-16 PROCEDURE — 85379 FIBRIN DEGRADATION QUANT: CPT

## 2025-03-16 PROCEDURE — 2500000003 HC RX 250 WO HCPCS: Performed by: HOSPITALIST

## 2025-03-16 PROCEDURE — 83605 ASSAY OF LACTIC ACID: CPT

## 2025-03-16 PROCEDURE — 6360000004 HC RX CONTRAST MEDICATION: Performed by: NURSE PRACTITIONER

## 2025-03-16 PROCEDURE — 80076 HEPATIC FUNCTION PANEL: CPT

## 2025-03-16 PROCEDURE — 83690 ASSAY OF LIPASE: CPT

## 2025-03-16 PROCEDURE — 36415 COLL VENOUS BLD VENIPUNCTURE: CPT

## 2025-03-16 PROCEDURE — 96374 THER/PROPH/DIAG INJ IV PUSH: CPT

## 2025-03-16 PROCEDURE — 81001 URINALYSIS AUTO W/SCOPE: CPT

## 2025-03-16 PROCEDURE — 74174 CTA ABD&PLVS W/CONTRAST: CPT

## 2025-03-16 PROCEDURE — 85025 COMPLETE CBC W/AUTO DIFF WBC: CPT

## 2025-03-16 PROCEDURE — 99285 EMERGENCY DEPT VISIT HI MDM: CPT

## 2025-03-16 PROCEDURE — 1200000000 HC SEMI PRIVATE

## 2025-03-16 PROCEDURE — 96375 TX/PRO/DX INJ NEW DRUG ADDON: CPT

## 2025-03-16 PROCEDURE — 2580000003 HC RX 258: Performed by: NURSE PRACTITIONER

## 2025-03-16 PROCEDURE — 6360000002 HC RX W HCPCS: Performed by: NURSE PRACTITIONER

## 2025-03-16 RX ORDER — MORPHINE SULFATE 4 MG/ML
4 INJECTION, SOLUTION INTRAMUSCULAR; INTRAVENOUS ONCE
Refills: 0 | Status: COMPLETED | OUTPATIENT
Start: 2025-03-16 | End: 2025-03-16

## 2025-03-16 RX ORDER — NORGESTIMATE AND ETHINYL ESTRADIOL 0.25-0.035
1 KIT ORAL DAILY
COMMUNITY

## 2025-03-16 RX ORDER — IOPAMIDOL 755 MG/ML
75 INJECTION, SOLUTION INTRAVASCULAR
Status: COMPLETED | OUTPATIENT
Start: 2025-03-16 | End: 2025-03-16

## 2025-03-16 RX ORDER — MORPHINE SULFATE 2 MG/ML
2 INJECTION, SOLUTION INTRAMUSCULAR; INTRAVENOUS
Status: DISCONTINUED | OUTPATIENT
Start: 2025-03-16 | End: 2025-03-19 | Stop reason: HOSPADM

## 2025-03-16 RX ORDER — MAGNESIUM SULFATE IN WATER 40 MG/ML
2000 INJECTION, SOLUTION INTRAVENOUS PRN
Status: DISCONTINUED | OUTPATIENT
Start: 2025-03-16 | End: 2025-03-19 | Stop reason: HOSPADM

## 2025-03-16 RX ORDER — ALBUTEROL SULFATE 0.83 MG/ML
2.5 SOLUTION RESPIRATORY (INHALATION) EVERY 6 HOURS PRN
Status: DISCONTINUED | OUTPATIENT
Start: 2025-03-16 | End: 2025-03-19 | Stop reason: HOSPADM

## 2025-03-16 RX ORDER — SODIUM CHLORIDE 9 MG/ML
INJECTION, SOLUTION INTRAVENOUS PRN
Status: DISCONTINUED | OUTPATIENT
Start: 2025-03-16 | End: 2025-03-19 | Stop reason: HOSPADM

## 2025-03-16 RX ORDER — HYDRALAZINE HYDROCHLORIDE 20 MG/ML
5 INJECTION INTRAMUSCULAR; INTRAVENOUS EVERY 4 HOURS PRN
Status: DISCONTINUED | OUTPATIENT
Start: 2025-03-16 | End: 2025-03-19 | Stop reason: HOSPADM

## 2025-03-16 RX ORDER — ONDANSETRON 2 MG/ML
4 INJECTION INTRAMUSCULAR; INTRAVENOUS ONCE
Status: COMPLETED | OUTPATIENT
Start: 2025-03-16 | End: 2025-03-16

## 2025-03-16 RX ORDER — POTASSIUM CHLORIDE 7.45 MG/ML
10 INJECTION INTRAVENOUS PRN
Status: DISCONTINUED | OUTPATIENT
Start: 2025-03-16 | End: 2025-03-19 | Stop reason: HOSPADM

## 2025-03-16 RX ORDER — ACETAMINOPHEN 325 MG/1
650 TABLET ORAL EVERY 6 HOURS PRN
Status: DISCONTINUED | OUTPATIENT
Start: 2025-03-16 | End: 2025-03-19 | Stop reason: HOSPADM

## 2025-03-16 RX ORDER — SODIUM CHLORIDE 0.9 % (FLUSH) 0.9 %
5-40 SYRINGE (ML) INJECTION EVERY 12 HOURS SCHEDULED
Status: DISCONTINUED | OUTPATIENT
Start: 2025-03-16 | End: 2025-03-19 | Stop reason: HOSPADM

## 2025-03-16 RX ORDER — ONDANSETRON 2 MG/ML
4 INJECTION INTRAMUSCULAR; INTRAVENOUS EVERY 6 HOURS PRN
Status: DISCONTINUED | OUTPATIENT
Start: 2025-03-16 | End: 2025-03-19 | Stop reason: HOSPADM

## 2025-03-16 RX ORDER — SODIUM CHLORIDE 9 MG/ML
INJECTION, SOLUTION INTRAVENOUS CONTINUOUS
Status: ACTIVE | OUTPATIENT
Start: 2025-03-16 | End: 2025-03-17

## 2025-03-16 RX ORDER — POTASSIUM CHLORIDE 1500 MG/1
40 TABLET, EXTENDED RELEASE ORAL PRN
Status: DISCONTINUED | OUTPATIENT
Start: 2025-03-16 | End: 2025-03-19 | Stop reason: HOSPADM

## 2025-03-16 RX ORDER — ACETAMINOPHEN 650 MG/1
650 SUPPOSITORY RECTAL EVERY 6 HOURS PRN
Status: DISCONTINUED | OUTPATIENT
Start: 2025-03-16 | End: 2025-03-19 | Stop reason: HOSPADM

## 2025-03-16 RX ORDER — ONDANSETRON 4 MG/1
4 TABLET, ORALLY DISINTEGRATING ORAL EVERY 8 HOURS PRN
Status: DISCONTINUED | OUTPATIENT
Start: 2025-03-16 | End: 2025-03-19 | Stop reason: HOSPADM

## 2025-03-16 RX ORDER — SODIUM CHLORIDE 0.9 % (FLUSH) 0.9 %
5-40 SYRINGE (ML) INJECTION PRN
Status: DISCONTINUED | OUTPATIENT
Start: 2025-03-16 | End: 2025-03-19 | Stop reason: HOSPADM

## 2025-03-16 RX ORDER — MORPHINE SULFATE 4 MG/ML
4 INJECTION, SOLUTION INTRAMUSCULAR; INTRAVENOUS
Status: DISCONTINUED | OUTPATIENT
Start: 2025-03-16 | End: 2025-03-19 | Stop reason: HOSPADM

## 2025-03-16 RX ADMIN — SODIUM CHLORIDE, PRESERVATIVE FREE 10 ML: 5 INJECTION INTRAVENOUS at 23:53

## 2025-03-16 RX ADMIN — IOPAMIDOL 75 ML: 755 INJECTION, SOLUTION INTRAVENOUS at 19:48

## 2025-03-16 RX ADMIN — MORPHINE SULFATE 4 MG: 4 INJECTION, SOLUTION INTRAMUSCULAR; INTRAVENOUS at 22:16

## 2025-03-16 RX ADMIN — ONDANSETRON 4 MG: 2 INJECTION, SOLUTION INTRAMUSCULAR; INTRAVENOUS at 18:01

## 2025-03-16 RX ADMIN — PIPERACILLIN AND TAZOBACTAM 3375 MG: 3; .375 INJECTION, POWDER, LYOPHILIZED, FOR SOLUTION INTRAVENOUS at 22:30

## 2025-03-16 RX ADMIN — MORPHINE SULFATE 4 MG: 4 INJECTION, SOLUTION INTRAMUSCULAR; INTRAVENOUS at 18:01

## 2025-03-16 RX ADMIN — IOPAMIDOL 75 ML: 755 INJECTION, SOLUTION INTRAVENOUS at 19:47

## 2025-03-16 RX ADMIN — ONDANSETRON 4 MG: 2 INJECTION, SOLUTION INTRAMUSCULAR; INTRAVENOUS at 22:16

## 2025-03-16 ASSESSMENT — PAIN SCALES - GENERAL
PAINLEVEL_OUTOF10: 10
PAINLEVEL_OUTOF10: 5
PAINLEVEL_OUTOF10: 8
PAINLEVEL_OUTOF10: 8

## 2025-03-16 ASSESSMENT — PAIN DESCRIPTION - ORIENTATION
ORIENTATION: RIGHT
ORIENTATION: RIGHT
ORIENTATION: RIGHT;LEFT;UPPER;LOWER

## 2025-03-16 ASSESSMENT — PAIN - FUNCTIONAL ASSESSMENT
PAIN_FUNCTIONAL_ASSESSMENT: ACTIVITIES ARE NOT PREVENTED
PAIN_FUNCTIONAL_ASSESSMENT: ACTIVITIES ARE NOT PREVENTED
PAIN_FUNCTIONAL_ASSESSMENT: 0-10

## 2025-03-16 ASSESSMENT — PAIN DESCRIPTION - LOCATION
LOCATION: ABDOMEN

## 2025-03-16 ASSESSMENT — PAIN DESCRIPTION - PAIN TYPE: TYPE: ACUTE PAIN

## 2025-03-16 NOTE — ED TRIAGE NOTES
Patient presents with severe abdominal pain that started today. She points to her right side and is tearful in triage. She states its mostly in her lower right side.

## 2025-03-16 NOTE — ED PROVIDER NOTES
ED Attending Attestation Note    I personally saw the patient and made/approved the management plan and take responsibility for patient management.     Briefly, 34 y.o. female presents with abdominal pain. Started yesterday with bloating. Tried miralax this morning. Had a BM, normal, nonbloody. Worsening abdominal pain thereafter. Sharp and tearing pain, radiates all the way up to the right neck. Never felt pain like this before. LMP 2 wks ago.     Past Medical History:   Diagnosis Date    Anemia     whil pregnant    Asthma     Dry skin     Hyperlipidemia     Hypertension         Focused exam:   Gen: 34 y.o. female, NAD  HEENT: NCAT  Neck: no carotid bruit  CV: RRR  Lungs: CTAB  Abdomen: diffuse TTP with voluntary guarding    Imaging:   CTA HEAD NECK W CONTRAST   Final Result   1.  No apparent arterial high grade stenosis, occlusion or aneurysm within   the head or neck.   2. Paranasal sinus inflammatory changes.         CTA CHEST ABDOMEN PELVIS W CONTRAST   Final Result   1. No evidence of thoracic or abdominal aortic aneurysm or dissection.   2. Moderately dilated gas and fluid-filled loops of small bowel proximally   with several thickened small bowel loops proximally. Distal small bowel loops   are not significantly distended or thickened but are fluid-filled. An exact   transition zone is not definitely identified. Free fluid in the upper abdomen   and pelvis is suspicious for compromised bowel. Findings are concerning for a   partial small bowel obstruction.   3. No acute cardiopulmonary process.         CT CHEST WO CONTRAST   Final Result   1. No acute cardiopulmonary process.   2. Small amount of free fluid anterior to the liver.              MDM:   Patient presented with sharp and tearing abdominal pain radiating to the right neck.  She underwent CTA chest abdomen and pelvis as well as CTA head and neck that showed no evidence of dissection but did show findings consistent with partial small bowel

## 2025-03-17 PROBLEM — K52.9 ENTERITIS: Status: ACTIVE | Noted: 2025-03-17

## 2025-03-17 LAB
ANION GAP SERPL CALCULATED.3IONS-SCNC: 9 MMOL/L (ref 3–16)
BASOPHILS # BLD: 0 K/UL (ref 0–0.2)
BASOPHILS NFR BLD: 0.4 %
BUN SERPL-MCNC: 8 MG/DL (ref 7–20)
CALCIUM SERPL-MCNC: 8.4 MG/DL (ref 8.3–10.6)
CHLORIDE SERPL-SCNC: 102 MMOL/L (ref 99–110)
CO2 SERPL-SCNC: 25 MMOL/L (ref 21–32)
CREAT SERPL-MCNC: 0.8 MG/DL (ref 0.6–1.1)
DEPRECATED RDW RBC AUTO: 12.7 % (ref 12.4–15.4)
EOSINOPHIL # BLD: 0.2 K/UL (ref 0–0.6)
EOSINOPHIL NFR BLD: 2.9 %
GFR SERPLBLD CREATININE-BSD FMLA CKD-EPI: >90 ML/MIN/{1.73_M2}
GLUCOSE SERPL-MCNC: 97 MG/DL (ref 70–99)
HCT VFR BLD AUTO: 35.8 % (ref 36–48)
HGB BLD-MCNC: 12.1 G/DL (ref 12–16)
LACTATE BLDV-SCNC: 0.8 MMOL/L (ref 0.4–2)
LYMPHOCYTES # BLD: 2.4 K/UL (ref 1–5.1)
LYMPHOCYTES NFR BLD: 33.8 %
MCH RBC QN AUTO: 29.1 PG (ref 26–34)
MCHC RBC AUTO-ENTMCNC: 33.9 G/DL (ref 31–36)
MCV RBC AUTO: 86 FL (ref 80–100)
MONOCYTES # BLD: 0.5 K/UL (ref 0–1.3)
MONOCYTES NFR BLD: 6.6 %
NEUTROPHILS # BLD: 4 K/UL (ref 1.7–7.7)
NEUTROPHILS NFR BLD: 56.3 %
PLATELET # BLD AUTO: 358 K/UL (ref 135–450)
PMV BLD AUTO: 7.6 FL (ref 5–10.5)
POTASSIUM SERPL-SCNC: 4.1 MMOL/L (ref 3.5–5.1)
RBC # BLD AUTO: 4.16 M/UL (ref 4–5.2)
SODIUM SERPL-SCNC: 136 MMOL/L (ref 136–145)
WBC # BLD AUTO: 7 K/UL (ref 4–11)

## 2025-03-17 PROCEDURE — 2580000003 HC RX 258: Performed by: HOSPITALIST

## 2025-03-17 PROCEDURE — APPNB15 APP NON BILLABLE TIME 0-15 MINS: Performed by: PHYSICIAN ASSISTANT

## 2025-03-17 PROCEDURE — 1200000000 HC SEMI PRIVATE

## 2025-03-17 PROCEDURE — 6360000002 HC RX W HCPCS: Performed by: HOSPITALIST

## 2025-03-17 PROCEDURE — 6370000000 HC RX 637 (ALT 250 FOR IP): Performed by: HOSPITALIST

## 2025-03-17 PROCEDURE — 36415 COLL VENOUS BLD VENIPUNCTURE: CPT

## 2025-03-17 PROCEDURE — 2500000003 HC RX 250 WO HCPCS: Performed by: HOSPITALIST

## 2025-03-17 PROCEDURE — 85025 COMPLETE CBC W/AUTO DIFF WBC: CPT

## 2025-03-17 PROCEDURE — 80048 BASIC METABOLIC PNL TOTAL CA: CPT

## 2025-03-17 PROCEDURE — 83605 ASSAY OF LACTIC ACID: CPT

## 2025-03-17 PROCEDURE — 94760 N-INVAS EAR/PLS OXIMETRY 1: CPT

## 2025-03-17 PROCEDURE — APPSS15 APP SPLIT SHARED TIME 0-15 MINUTES: Performed by: PHYSICIAN ASSISTANT

## 2025-03-17 RX ADMIN — ACETAMINOPHEN 650 MG: 325 TABLET ORAL at 13:55

## 2025-03-17 RX ADMIN — PIPERACILLIN AND TAZOBACTAM 3375 MG: 3; .375 INJECTION, POWDER, LYOPHILIZED, FOR SOLUTION INTRAVENOUS at 11:42

## 2025-03-17 RX ADMIN — PIPERACILLIN AND TAZOBACTAM 3375 MG: 3; .375 INJECTION, POWDER, LYOPHILIZED, FOR SOLUTION INTRAVENOUS at 20:32

## 2025-03-17 RX ADMIN — SODIUM CHLORIDE: 0.9 INJECTION, SOLUTION INTRAVENOUS at 02:42

## 2025-03-17 RX ADMIN — SODIUM CHLORIDE: 0.9 INJECTION, SOLUTION INTRAVENOUS at 13:57

## 2025-03-17 RX ADMIN — PIPERACILLIN AND TAZOBACTAM 3375 MG: 3; .375 INJECTION, POWDER, LYOPHILIZED, FOR SOLUTION INTRAVENOUS at 04:46

## 2025-03-17 RX ADMIN — SODIUM CHLORIDE, PRESERVATIVE FREE 10 ML: 5 INJECTION INTRAVENOUS at 20:27

## 2025-03-17 ASSESSMENT — PAIN DESCRIPTION - DESCRIPTORS: DESCRIPTORS: ACHING;DISCOMFORT

## 2025-03-17 ASSESSMENT — PAIN - FUNCTIONAL ASSESSMENT: PAIN_FUNCTIONAL_ASSESSMENT: ACTIVITIES ARE NOT PREVENTED

## 2025-03-17 ASSESSMENT — PAIN SCALES - GENERAL
PAINLEVEL_OUTOF10: 4
PAINLEVEL_OUTOF10: 0

## 2025-03-17 ASSESSMENT — PAIN DESCRIPTION - LOCATION: LOCATION: HEAD

## 2025-03-17 NOTE — CONSULTS
Surgery Consult Note     Alex Winkler PA-C  Pt Name: Kelly Brunner  MRN: [id number]  YOB: 1990  Date of evaluation: 3/17/2025  Primary Care Physician: Lexi Moura MD  Referred By: Curt Stauffer   Reason for Consultation: partial small bowel obstruction   Chief Complaint:Abdominal pain  IMPRESSIONS:   PSBO  WBC count: 13.6 --> 7.0  PLANS:   Monitor and control pain  IVF  NPO  OOB as tolerated  No general surgery plans at this time. Will monitor for symptoms to improve. If symptoms continue or worsen may need further imaging vs intervention.   SUBJECTIVE:   History of Chief Complaint:    Kelly Brunner is a 34 y.o. female who presents with abdominal pain. She stated that the pain began 2 days ago and it is located on the right side of her abdomen. A CT scan was performed and that showed moderately dilated gas and fluid-filled loops of small bowel proximally with several thickened small bowel loops proximally. She is feeling better since admission.   Past Medical History  Reviewed  has a past medical history of Anemia, Asthma, Dry skin, Hyperlipidemia, and Hypertension.  Past Surgical History  Reviewed has no past surgical history on file.  Medications  Prior to Admission medications    Medication Sig Start Date End Date Taking? Authorizing Provider   norgestimate-ethinyl estradiol (SPRINTEC 28) 0.25-35 MG-MCG per tablet Take 1 tablet by mouth daily   Yes ProviderJayla MD   phentermine (ADIPEX-P) 37.5 MG tablet Take 1 tablet by mouth every morning (before breakfast).   Yes ProviderJayla MD   cetirizine (ZYRTEC) 10 MG tablet Take 1 tablet by mouth daily   Yes Jayla Diaz MD   atorvastatin (LIPITOR) 10 MG tablet Take 1 tablet by mouth daily 10/15/24  Yes Lexi Moura MD   lisinopril (PRINIVIL;ZESTRIL) 10 MG tablet Take 1 tablet by mouth daily 10/7/24  Yes Lexi Moura MD   albuterol sulfate HFA (PROVENTIL;VENTOLIN;PROAIR) 108 (90 Base) MCG/ACT inhaler INHALE 2 PUFFS

## 2025-03-17 NOTE — H&P
V2.0  History and Physical      Name:  Kelly Brunner /Age/Sex: 1990  (34 y.o. female)   MRN & CSN:  [id number] & [id number] Encounter Date/Time: 3/16/2025 9:39 PM EDT   Location:   PCP: Lexi Moura MD       Hospital Day: 1    Assessment and Plan:   Kelly Brunner is a 34 y.o. female with a pmh of hypertension, class III obesity, and mild intermittent asthma who presents with SBO (small bowel obstruction) ([facility])    Hospital Problems           Last Modified POA    * (Principal) SBO (small bowel obstruction) ([facility]) 3/16/2025 Yes    BMI 40.0-44.9, adult 10/7/2024 Yes    Mild persistent asthma without complication 10/7/2024 Yes    Primary hypertension 10/7/2024 Yes       Plan:  ED physician discussed case with general surgery who recommended IV antibiotics and to keep patient n.p.o. after midnight.  Recommendations followed.  As needed Zofran and IV fluids ordered.  As needed albuterol ordered  While NPO as needed hydralazine ordered.  Of note blood pressure is stable.  Trend blood pressure and adjust blood pressure medications as necessary.  Mild intermittent asthma without complications-as needed albuterol ordered.  Discussed case with patient and  who was at the bedside.      Advance care planning:  Advanced care planning was discussed with patient for a total of  16 minutes.  Full code, DNR CC, DNR CCA, power of  and living will were discussed.  Patient elected to be  a full code.   Disposition:   Current Living situation: Home  Expected Disposition: Home  Estimated D/C: 3 days    Diet Diet NPO   DVT Prophylaxis [] Lovenox, []  Heparin, [] SCDs, [] Ambulation,  [] Eliquis, [] Xarelto, [] Coumadin   Code Status Full code   Surrogate Decision Maker/ POA , who was at the bedside.     Personally reviewed Lab Studies and Imaging     Discussed management of the case with  ED provider who recommended admission      CT CHEST WO CONTRAST interpreted by myself showed no PE or atrial

## 2025-03-17 NOTE — ED PROVIDER NOTES
[facility] EMERGENCY DEPARTMENT  EMERGENCY DEPARTMENT ENCOUNTER        Pt Name: Kelly Brunner  MRN: [id number]  Birthdate 1990  Date of evaluation: 3/16/2025  Provider: MARK Vale - MONO  PCP: Lexi Moura MD  Note Started: 8:49 PM EDT 3/16/25       I have seen and evaluated this patient with my supervising physician Mich Vásquez MD.      CHIEF COMPLAINT       Chief Complaint   Patient presents with    Abdominal Pain     Patient states she has right sided abdominal pain that started today. She states she has no nausea.        HISTORY OF PRESENT ILLNESS: 1 or more Elements     History From: Patient  Limitations to history : None    Kelly Brunner is a 34 y.o. nontoxic, well-appearing but in mild distress female who presents to the Emergency Department for evaluation of acute onset earlier today with diffuse body >right upper abdominal pain described as \"sharp and squeezing\" rated severity of 9/10.  Pain radiates from abdomen up to right side of neck.  Accompanying symptoms include nausea and radiation of the pain from her abdomen, up her right flank all the way to her neck.  Denies vomiting, lightheadedness, dizziness, presyncope, shortness of breath, fever, chills, sweats, extremity weakness, diarrhea, urinary symptoms/retention, or other symptoms/concerns.    Nursing Notes were all reviewed and agreed with or any disagreements were addressed in the HPI.    REVIEW OF SYSTEMS :      Review of Systems   Constitutional:  Negative for chills, diaphoresis, fatigue and fever.   HENT:  Negative for congestion and sore throat.    Eyes:  Negative for pain and visual disturbance.   Respiratory:  Negative for cough and shortness of breath.    Cardiovascular:  Negative for chest pain and leg swelling.   Gastrointestinal:  Positive for abdominal pain and nausea. Negative for anal bleeding, diarrhea and vomiting.   Genitourinary:  Negative for difficulty urinating, dysuria, frequency and

## 2025-03-17 NOTE — CARE COORDINATION
Quick chart review revealed that patient is from home, with family, ambulatory, has insurance and PCP. Will watch needs, full assmt not required.     Discharge Planning:      (CM) reviewed the patient's chart to assess needs. Patient's Readmission Risk Score is   . Patient's medical insurance is  Payor: [employer] / Plan: [employer] [place] MEDICAID / Product Type: *No Product type* / .  Patient's PCP is Lexi Moura MD .  No needs anticipated, at this time. CM team to follow. Staff to inform CM if additional discharge needs arise.    Pts preferred pharmacy is   [facility] [address] [facility] ([place]), [place] - [address] - P [phone] - F [phone]  [address] [facility] [place] [address]  Phone: [phone] Fax: [phone]       Carlos Ordonez LMSW, [facility] Social Work Case Management   Phone: [phone]  Fax: [phone]

## 2025-03-17 NOTE — ED NOTES
ED TO INPATIENT SBAR HANDOFF    Patient Name: Kelly Brunner   Preferred Name: Polina  : 1990  34 y.o.   Family/Caregiver Present: no   Code Status Order: Full Code  PO Status: NPO:Yes  Telemetry Order:   C-SSRS: Risk of Suicide: No Risk  Sitter no   Restraints:     Sepsis Risk Score      Situation  Chief Complaint   Patient presents with    Abdominal Pain     Patient states she has right sided abdominal pain that started today. She states she has no nausea.      Brief Description of Patient's Condition: Kelly Brunner is a 34 y.o. female with a pmh of hypertension, class III obesity, and mild intermittent asthma who presents with who presents with excruciating right-sided pain that radiates to her entire belly.  The pain has a constant aching component and episodic sharp pain.  The pain radiates to her entire abdomen.  The pain started on the day of before presentation at that time she was constipated on the day of presentation her bowels moved.  Workup at the emergency department showed small bowel obstruction.  ED physician discussed case with general surgery who will be on consult.                Mental Status: oriented, alert, coherent, logical, thought processes intact, and able to concentrate and follow conversation  Arrived from:Home  Imaging:   CTA HEAD NECK W CONTRAST   Final Result   1.  No apparent arterial high grade stenosis, occlusion or aneurysm within   the head or neck.   2. Paranasal sinus inflammatory changes.         CTA CHEST ABDOMEN PELVIS W CONTRAST   Final Result   1. No evidence of thoracic or abdominal aortic aneurysm or dissection.   2. Moderately dilated gas and fluid-filled loops of small bowel proximally   with several thickened small bowel loops proximally. Distal small bowel loops   are not significantly distended or thickened but are fluid-filled. An exact   transition zone is not definitely identified. Free fluid in the upper abdomen   and pelvis is suspicious for compromised

## 2025-03-17 NOTE — H&P
V2.0  History and Physical      Name:  Kelly Brunner /Age/Sex: 1990  (34 y.o. female)   MRN & CSN:  [id number] & [id number] Encounter Date/Time: 3/16/2025 9:39 PM EDT   Location:   PCP: Lexi Moura MD       Hospital Day: 1    Assessment and Plan:   Kelly Brunner is a 34 y.o. female with a pmh of hypertension, class III obesity, and mild intermittent asthma who presents with SBO (small bowel obstruction) (HCC)    Hospital Problems           Last Modified POA    BMI 40.0-44.9, adult 10/7/2024 Yes    Mild persistent asthma without complication 10/7/2024 Yes    Primary hypertension 10/7/2024 Yes       Plan:  ED physician discussed case with general surgery who recommended IV antibiotics and to keep patient n.p.o. after midnight.  Recommendations followed.  As needed albuterol ordered  While NPO as needed hydralazine ordered.  Of note blood pressure is stable.  Trend blood pressure and adjust blood pressure medications as necessary.  Mild intermittent asthma without complications-as needed albuterol ordered.  Discussed case with patient and  who was at the bedside.      Advance care planning:  Advanced care planning was discussed with patient for a total of  16 minutes.  Full code, DNR CC, DNR CCA, power of  and living will were discussed.  Patient elected to be  a full code.   Disposition:   Current Living situation: Home  Expected Disposition: Home  Estimated D/C: 3 days    Diet Diet NPO   DVT Prophylaxis [] Lovenox, []  Heparin, [] SCDs, [] Ambulation,  [] Eliquis, [] Xarelto, [] Coumadin   Code Status Full code   Surrogate Decision Maker/ POA , who was at the bedside.     Personally reviewed Lab Studies and Imaging     Discussed management of the case with  ED provider who recommended admission      CT CHEST WO CONTRAST interpreted by myself showed no PE or atrial dissection.    History from:     patient    History of Present Illness:     Chief Complaint: Abdominal

## 2025-03-18 LAB
ALBUMIN SERPL-MCNC: 3.5 G/DL (ref 3.4–5)
ALP SERPL-CCNC: 105 U/L (ref 40–129)
ALT SERPL-CCNC: 12 U/L (ref 10–40)
ANION GAP SERPL CALCULATED.3IONS-SCNC: 10 MMOL/L (ref 3–16)
AST SERPL-CCNC: 15 U/L (ref 15–37)
BASOPHILS # BLD: 0 K/UL (ref 0–0.2)
BASOPHILS NFR BLD: 0.3 %
BILIRUB DIRECT SERPL-MCNC: 0.2 MG/DL (ref 0–0.3)
BILIRUB INDIRECT SERPL-MCNC: 0.2 MG/DL (ref 0–1)
BILIRUB SERPL-MCNC: 0.4 MG/DL (ref 0–1)
BUN SERPL-MCNC: 6 MG/DL (ref 7–20)
CALCIUM SERPL-MCNC: 8.5 MG/DL (ref 8.3–10.6)
CHLORIDE SERPL-SCNC: 104 MMOL/L (ref 99–110)
CO2 SERPL-SCNC: 25 MMOL/L (ref 21–32)
CREAT SERPL-MCNC: 0.7 MG/DL (ref 0.6–1.1)
DEPRECATED RDW RBC AUTO: 12.7 % (ref 12.4–15.4)
EOSINOPHIL # BLD: 0.3 K/UL (ref 0–0.6)
EOSINOPHIL NFR BLD: 4.3 %
GFR SERPLBLD CREATININE-BSD FMLA CKD-EPI: >90 ML/MIN/{1.73_M2}
GLUCOSE SERPL-MCNC: 77 MG/DL (ref 70–99)
HCT VFR BLD AUTO: 33.7 % (ref 36–48)
HGB BLD-MCNC: 11.6 G/DL (ref 12–16)
LYMPHOCYTES # BLD: 2.3 K/UL (ref 1–5.1)
LYMPHOCYTES NFR BLD: 37.3 %
MCH RBC QN AUTO: 29.5 PG (ref 26–34)
MCHC RBC AUTO-ENTMCNC: 34.2 G/DL (ref 31–36)
MCV RBC AUTO: 86.1 FL (ref 80–100)
MONOCYTES # BLD: 0.4 K/UL (ref 0–1.3)
MONOCYTES NFR BLD: 6.2 %
NEUTROPHILS # BLD: 3.2 K/UL (ref 1.7–7.7)
NEUTROPHILS NFR BLD: 51.9 %
PLATELET # BLD AUTO: 327 K/UL (ref 135–450)
PMV BLD AUTO: 7.9 FL (ref 5–10.5)
POTASSIUM SERPL-SCNC: 4.2 MMOL/L (ref 3.5–5.1)
PROT SERPL-MCNC: 6.2 G/DL (ref 6.4–8.2)
RBC # BLD AUTO: 3.92 M/UL (ref 4–5.2)
SODIUM SERPL-SCNC: 139 MMOL/L (ref 136–145)
WBC # BLD AUTO: 6.1 K/UL (ref 4–11)

## 2025-03-18 PROCEDURE — 2500000003 HC RX 250 WO HCPCS: Performed by: HOSPITALIST

## 2025-03-18 PROCEDURE — 6360000002 HC RX W HCPCS: Performed by: HOSPITALIST

## 2025-03-18 PROCEDURE — 36415 COLL VENOUS BLD VENIPUNCTURE: CPT

## 2025-03-18 PROCEDURE — 6370000000 HC RX 637 (ALT 250 FOR IP): Performed by: STUDENT IN AN ORGANIZED HEALTH CARE EDUCATION/TRAINING PROGRAM

## 2025-03-18 PROCEDURE — 1200000000 HC SEMI PRIVATE

## 2025-03-18 PROCEDURE — 6370000000 HC RX 637 (ALT 250 FOR IP): Performed by: HOSPITALIST

## 2025-03-18 PROCEDURE — 80048 BASIC METABOLIC PNL TOTAL CA: CPT

## 2025-03-18 PROCEDURE — 80076 HEPATIC FUNCTION PANEL: CPT

## 2025-03-18 PROCEDURE — 2580000003 HC RX 258: Performed by: HOSPITALIST

## 2025-03-18 PROCEDURE — 85025 COMPLETE CBC W/AUTO DIFF WBC: CPT

## 2025-03-18 PROCEDURE — 94760 N-INVAS EAR/PLS OXIMETRY 1: CPT

## 2025-03-18 RX ORDER — LISINOPRIL 10 MG/1
10 TABLET ORAL DAILY
Status: DISCONTINUED | OUTPATIENT
Start: 2025-03-18 | End: 2025-03-19 | Stop reason: HOSPADM

## 2025-03-18 RX ORDER — NORGESTIMATE AND ETHINYL ESTRADIOL 0.25-0.035
1 KIT ORAL DAILY
Status: DISCONTINUED | OUTPATIENT
Start: 2025-03-18 | End: 2025-03-19 | Stop reason: HOSPADM

## 2025-03-18 RX ORDER — ATORVASTATIN CALCIUM 10 MG/1
10 TABLET, FILM COATED ORAL DAILY
Status: DISCONTINUED | OUTPATIENT
Start: 2025-03-18 | End: 2025-03-19 | Stop reason: HOSPADM

## 2025-03-18 RX ORDER — CETIRIZINE HYDROCHLORIDE 10 MG/1
10 TABLET ORAL DAILY
Status: DISCONTINUED | OUTPATIENT
Start: 2025-03-18 | End: 2025-03-19 | Stop reason: HOSPADM

## 2025-03-18 RX ADMIN — LISINOPRIL 10 MG: 10 TABLET ORAL at 09:02

## 2025-03-18 RX ADMIN — SODIUM CHLORIDE: 0.9 INJECTION, SOLUTION INTRAVENOUS at 20:09

## 2025-03-18 RX ADMIN — PIPERACILLIN AND TAZOBACTAM 3375 MG: 3; .375 INJECTION, POWDER, LYOPHILIZED, FOR SOLUTION INTRAVENOUS at 20:28

## 2025-03-18 RX ADMIN — ACETAMINOPHEN 650 MG: 325 TABLET ORAL at 09:02

## 2025-03-18 RX ADMIN — CETIRIZINE HYDROCHLORIDE 10 MG: 10 TABLET, FILM COATED ORAL at 09:02

## 2025-03-18 RX ADMIN — PIPERACILLIN AND TAZOBACTAM 3375 MG: 3; .375 INJECTION, POWDER, LYOPHILIZED, FOR SOLUTION INTRAVENOUS at 04:34

## 2025-03-18 RX ADMIN — ATORVASTATIN CALCIUM 10 MG: 10 TABLET, FILM COATED ORAL at 09:02

## 2025-03-18 RX ADMIN — SODIUM CHLORIDE, PRESERVATIVE FREE 10 ML: 5 INJECTION INTRAVENOUS at 09:03

## 2025-03-18 RX ADMIN — PIPERACILLIN AND TAZOBACTAM 3375 MG: 3; .375 INJECTION, POWDER, LYOPHILIZED, FOR SOLUTION INTRAVENOUS at 12:03

## 2025-03-18 RX ADMIN — SODIUM CHLORIDE, PRESERVATIVE FREE 10 ML: 5 INJECTION INTRAVENOUS at 20:24

## 2025-03-18 ASSESSMENT — PAIN DESCRIPTION - LOCATION: LOCATION: HEAD

## 2025-03-18 ASSESSMENT — PAIN SCALES - GENERAL
PAINLEVEL_OUTOF10: 4
PAINLEVEL_OUTOF10: 0

## 2025-03-18 ASSESSMENT — PAIN DESCRIPTION - DESCRIPTORS: DESCRIPTORS: ACHING;DISCOMFORT

## 2025-03-18 NOTE — PLAN OF CARE
Problem: Discharge Planning  Goal: Discharge to home or other facility with appropriate resources  3/18/2025 1038 by Rosalina Pritchard RN  Outcome: Progressing  3/17/2025 2221 by Robert Khan RN  Outcome: Progressing     Problem: Pain  Goal: Verbalizes/displays adequate comfort level or baseline comfort level  3/18/2025 1038 by Rosalina Pritchard RN  Outcome: Progressing  3/17/2025 2221 by Robert Khan RN  Outcome: Progressing     Problem: Safety - Adult  Goal: Free from fall injury  3/18/2025 1038 by Rosalina Pritchard RN  Outcome: Progressing  3/17/2025 2221 by Robert Khan, RN  Outcome: Progressing

## 2025-03-18 NOTE — PLAN OF CARE
Problem: Discharge Planning  Goal: Discharge to home or other facility with appropriate resources  3/17/2025 2221 by Robert Khan, RN  Outcome: Progressing  3/17/2025 1248 by Rosalina Pritchard RN  Outcome: Progressing     Problem: Pain  Goal: Verbalizes/displays adequate comfort level or baseline comfort level  3/17/2025 2221 by Robert Khan, RN  Outcome: Progressing  3/17/2025 1248 by Rosalina Pritchard RN  Outcome: Progressing     Problem: Safety - Adult  Goal: Free from fall injury  3/17/2025 2221 by Robert Khan, RN  Outcome: Progressing  3/17/2025 1248 by Rosalina Pritchard, RN  Outcome: Progressing

## 2025-03-18 NOTE — CARE COORDINATION
Chart review done, rounds completed. Pt is improving, pending diet advancement, likely dc tomorrow.     Carlos Ordonez LMSW, [facility] Social Work Case Management   Phone: [phone]  Fax: [phone]

## 2025-03-19 ENCOUNTER — TELEPHONE (OUTPATIENT)
Dept: INTERNAL MEDICINE CLINIC | Age: 35
End: 2025-03-19

## 2025-03-19 VITALS
DIASTOLIC BLOOD PRESSURE: 65 MMHG | BODY MASS INDEX: 42.96 KG/M2 | HEIGHT: 62 IN | OXYGEN SATURATION: 98 % | RESPIRATION RATE: 16 BRPM | WEIGHT: 233.47 LBS | SYSTOLIC BLOOD PRESSURE: 111 MMHG | HEART RATE: 66 BPM | TEMPERATURE: 98.2 F

## 2025-03-19 LAB
ANION GAP SERPL CALCULATED.3IONS-SCNC: 10 MMOL/L (ref 3–16)
BASOPHILS # BLD: 0 K/UL (ref 0–0.2)
BASOPHILS NFR BLD: 0.4 %
BUN SERPL-MCNC: 6 MG/DL (ref 7–20)
CALCIUM SERPL-MCNC: 9 MG/DL (ref 8.3–10.6)
CHLORIDE SERPL-SCNC: 104 MMOL/L (ref 99–110)
CO2 SERPL-SCNC: 24 MMOL/L (ref 21–32)
CREAT SERPL-MCNC: 0.7 MG/DL (ref 0.6–1.1)
DEPRECATED RDW RBC AUTO: 12.4 % (ref 12.4–15.4)
EOSINOPHIL # BLD: 0.2 K/UL (ref 0–0.6)
EOSINOPHIL NFR BLD: 4.4 %
GFR SERPLBLD CREATININE-BSD FMLA CKD-EPI: >90 ML/MIN/{1.73_M2}
GLUCOSE SERPL-MCNC: 101 MG/DL (ref 70–99)
HCT VFR BLD AUTO: 34.9 % (ref 36–48)
HGB BLD-MCNC: 11.9 G/DL (ref 12–16)
LYMPHOCYTES # BLD: 2.4 K/UL (ref 1–5.1)
LYMPHOCYTES NFR BLD: 50.7 %
MCH RBC QN AUTO: 29.3 PG (ref 26–34)
MCHC RBC AUTO-ENTMCNC: 34.2 G/DL (ref 31–36)
MCV RBC AUTO: 85.6 FL (ref 80–100)
MONOCYTES # BLD: 0.4 K/UL (ref 0–1.3)
MONOCYTES NFR BLD: 9.2 %
NEUTROPHILS # BLD: 1.7 K/UL (ref 1.7–7.7)
NEUTROPHILS NFR BLD: 35.3 %
PLATELET # BLD AUTO: 363 K/UL (ref 135–450)
PMV BLD AUTO: 7.7 FL (ref 5–10.5)
POTASSIUM SERPL-SCNC: 4.3 MMOL/L (ref 3.5–5.1)
RBC # BLD AUTO: 4.07 M/UL (ref 4–5.2)
SODIUM SERPL-SCNC: 138 MMOL/L (ref 136–145)
WBC # BLD AUTO: 4.8 K/UL (ref 4–11)

## 2025-03-19 PROCEDURE — 2580000003 HC RX 258: Performed by: HOSPITALIST

## 2025-03-19 PROCEDURE — 6370000000 HC RX 637 (ALT 250 FOR IP): Performed by: STUDENT IN AN ORGANIZED HEALTH CARE EDUCATION/TRAINING PROGRAM

## 2025-03-19 PROCEDURE — 80048 BASIC METABOLIC PNL TOTAL CA: CPT

## 2025-03-19 PROCEDURE — 6360000002 HC RX W HCPCS: Performed by: HOSPITALIST

## 2025-03-19 PROCEDURE — 36415 COLL VENOUS BLD VENIPUNCTURE: CPT

## 2025-03-19 PROCEDURE — 85025 COMPLETE CBC W/AUTO DIFF WBC: CPT

## 2025-03-19 RX ADMIN — PIPERACILLIN AND TAZOBACTAM 3375 MG: 3; .375 INJECTION, POWDER, LYOPHILIZED, FOR SOLUTION INTRAVENOUS at 04:51

## 2025-03-19 RX ADMIN — CETIRIZINE HYDROCHLORIDE 10 MG: 10 TABLET, FILM COATED ORAL at 08:41

## 2025-03-19 RX ADMIN — ATORVASTATIN CALCIUM 10 MG: 10 TABLET, FILM COATED ORAL at 08:42

## 2025-03-19 RX ADMIN — LISINOPRIL 10 MG: 10 TABLET ORAL at 08:41

## 2025-03-19 ASSESSMENT — PAIN SCALES - GENERAL: PAINLEVEL_OUTOF10: 0

## 2025-03-19 NOTE — DISCHARGE SUMMARY
03/18/25  0436 03/19/25  0354    139 138   K 4.1 4.2 4.3    104 104   CO2 25 25 24   BUN 8 6* 6*   CREATININE 0.8 0.7 0.7   GLUCOSE 97 77 101*     Hepatic:   Recent Labs     03/16/25  1724 03/18/25  0436   AST 17 15   ALT 15 12   BILITOT 0.6 0.4   ALKPHOS 136* 105     Lipids:   Lab Results   Component Value Date/Time    CHOL 294 10/10/2024 09:15 AM    HDL 62 10/10/2024 09:15 AM    TRIG 210 10/10/2024 09:15 AM     Hemoglobin A1C:   Lab Results   Component Value Date/Time    LABA1C 5.4 10/10/2024 09:15 AM     TSH: No results found for: \"TSH\"  Troponin: No results found for: \"TROPONINT\"  Lactic Acid:   Recent Labs     03/16/25  1724 03/17/25  0813   LACTA 1.3 0.8     BNP: No results for input(s): \"PROBNP\" in the last 72 hours.  UA:  Lab Results   Component Value Date/Time    NITRU Negative 03/16/2025 07:01 PM    COLORU DARK YELLOW 03/16/2025 07:01 PM    PHUR 6.0 03/16/2025 07:01 PM    WBCUA 9 03/16/2025 07:01 PM    RBCUA 2 03/16/2025 07:01 PM    BACTERIA 1+ 03/16/2025 07:01 PM    CLARITYU Clear 03/16/2025 07:01 PM    LEUKOCYTESUR TRACE 03/16/2025 07:01 PM    UROBILINOGEN 1.0 03/16/2025 07:01 PM    BILIRUBINUR Negative 03/16/2025 07:01 PM    BLOODU Negative 03/16/2025 07:01 PM    GLUCOSEU Negative 03/16/2025 07:01 PM    KETUA TRACE 03/16/2025 07:01 PM     Urine Cultures: No results found for: \"LABURIN\"  Blood Cultures: No results found for: \"BC\"  No results found for: \"BLOODCULT2\"  Organism: No results found for: \"ORG\"    Time Spent Discharging patient 34 minutes    Electronically signed by Denzel Dunne DO on 3/19/2025 at 9:14 AM

## 2025-03-19 NOTE — TELEPHONE ENCOUNTER
Care Transitions Initial Follow Up Call    Outreach made within 2 business days of discharge: Yes    Patient: Kelly Brunner Patient : 1990   MRN: [id number]  Reason for Admission: partial small bowel obstruction   Discharge Date: 3/19/25       Spoke with: LEFT MESSAGE FOR PATIENT TO CALL BACK     Discharge department/facility: [facility] Interactive Patient Contact:  Was patient able to fill all prescriptions:   Was patient instructed to bring all medications to the follow-up visit:   Is patient taking all medications as directed in the discharge summary?   Does patient understand their discharge instructions:   Does patient have questions or concerns that need addressed prior to 7-14 day follow up office visit:     Additional needs identified to be addressed with provider               Scheduled appointment with PCP within 7-14 days    Follow Up  No future appointments.    MEGGAN MCKEON MA

## 2025-03-19 NOTE — PROGRESS NOTES
General and Vascular Surgery                                                           Daily Progress Note                                                             Alex Winkler PA-C    Pt Name: Kelly Brunner  Medical Record Number: [id number]  Date of Birth 1990   Today's Date: 3/18/2025    ASSESSMENT/PLAN  Gastroenteritis vs enteritis  WBC count 13.6 --> 7.0 --> 6.1  Feeling better  Denies any nausea or emesis  +flatulence and BM's  Tolerating clear liquids. Will advance diet to full liquids as tolerated.  OOB and ambulate  Will continue to monitor for symptoms to improve. If symptoms continue or worsen may need further imaging vs intervention.     EDUCATION  Patient educated about their illness/diagnosis, stated above, and all questions answered. We discussed the importance of nutrition, medications they are taking, and healthy lifestyle.  SUBJECTIVE  Polina has improved from yesterday. Pain is well controlled. She has no nausea and no vomiting.  She has passed flatus and has had a bowel movement. She is tolerating thin liquids. Current activity is ad calderon    OBJECTIVE  VITALS:  height is 1.58 m (5' 2.21\") and weight is 106.6 kg (235 lb 0.2 oz). Her temperature is 97.9 °F (36.6 °C). Her blood pressure is 124/84 and her pulse is 82. Her respiration is 18 and oxygen saturation is 97%. VITALS:  /84   Pulse 82   Temp 97.9 °F (36.6 °C)   Resp 18   Ht 1.58 m (5' 2.21\")   Wt 106.6 kg (235 lb 0.2 oz)   SpO2 97%   BMI 42.70 kg/m²   GENERAL: alert, no distress  LUNGS: clear to ausculation, without wheezes, rales or rhonci  HEART: normal rate and regular rhythm  ABDOMEN: soft, mild right sided, non-distended  EXTREMITY: no cyanosis, clubbing or edema  No intake/output data recorded.  No intake/output data recorded.    LABS  Recent Labs     03/16/25  1901 03/17/25  0813 03/18/25  0436   WBC  --    < > 6.1   HGB  --    < > 11.6*   HCT  --    < > 
4 Eyes Skin Assessment     NAME:  Kelly Brunner  YOB: 1990  MEDICAL RECORD NUMBER:  [id number]    The patient is being assessed for  Admission    I agree that at least one RN has performed a thorough Head to Toe Skin Assessment on the patient. ALL assessment sites listed below have been assessed.      Areas assessed by both nurses:    Head, Face, Ears, Shoulders, Back, Chest, Arms, Elbows, Hands, Sacrum. Buttock, Coccyx, Ischium, Legs. Feet and Heels, and Under Medical Devices         Does the Patient have a Wound? No noted wound(s)       Milton Prevention initiated by RN: No  Wound Care Orders initiated by RN: No    Pressure Injury (Stage 3,4, Unstageable, DTI, NWPT, and Complex wounds) if present, place Wound referral order by RN under : No    New Ostomies, if present place, Ostomy referral order under : No     Nurse 1 eSignature: Electronically signed by Rosalina Pritchard RN on 3/17/25 at 1:05 PM EDT    **SHARE this note so that the co-signing nurse can place an eSignature**    Nurse 2 eSignature: Electronically signed by Elaine Aragon RN on 3/17/25 at 1:05 PM EDT e  
IV removed without complication, dry dressing applied. Discharge instructions reviewed with patient, pt verbalized understanding. Pt ambulatory for discharge.   
Medication Reconciliation    List of medications patient is currently taking is complete.     Source of information: 1. Conversation with patient at bedside                                      2. EPIC records      Allergies  Patient has no known allergies.     Notes regarding home medications:     1. Patient has not received any of her home medications prior to arrival to the emergency department.    Maria Isabel Garduno, Pharmacy Intern  3/16/2025 9:33 PM            
Patient admitted to room 4258. A&Ox4. Respirations are easy and unlabored. VSS. Oriented to room and call light. Patient denies additional needs at this time. Call light is in reach.    Electronically signed by Rosalina Pritchard RN on 3/17/2025 at 8:19 AM     
Pt in bed resting, VSS & Zosyn infusing 12.5 mL/hr. Pt denies any chest pain or SOB. Encouraged pt to ambulate gaston and call if needs arise.   Electronically signed by LAVERNE IBARRA RN on 3/18/2025 at 8:45 PM    Pt ambulating unit hallway with no difficulty.  Electronically signed by LAVERNE IBARRA RN on 3/18/2025 at 10:09 PM    Zosyn infusing at this time. Pt encouraged to call this RN if needs arise.   Electronically signed by LAVERNE IBARRA RN on 3/19/2025 at 5:51 AM    
 < > 139 138   K  --    < > 4.2 4.3   CL  --    < > 104 104   CO2  --    < > 25 24   BUN  --    < > 6* 6*   CREATININE  --    < > 0.7 0.7   CALCIUM  --    < > 8.5 9.0   AST  --   --  15  --    ALT  --   --  12  --    BILITOT  --   --  0.4  --    BILIDIR  --   --  0.2  --    NITRU Negative  --   --   --    COLORU DARK YELLOW*  --   --   --    BACTERIA 1+*  --   --   --     < > = values in this interval not displayed.   CBC:   Lab Results   Component Value Date/Time    WBC 4.8 03/19/2025 03:54 AM    RBC 4.07 03/19/2025 03:54 AM    HGB 11.9 03/19/2025 03:54 AM    HCT 34.9 03/19/2025 03:54 AM    MCV 85.6 03/19/2025 03:54 AM    MCH 29.3 03/19/2025 03:54 AM    MCHC 34.2 03/19/2025 03:54 AM    RDW 12.4 03/19/2025 03:54 AM     03/19/2025 03:54 AM    MPV 7.7 03/19/2025 03:54 AM         Alex Winkler PA-C  Electronically signed 3/19/2025 at 9:54 AM    Agree with above note.  The patient was personally seen and examined.  Kelly Brunner is doing better today.  She is tolerating a regular diet.  She is passing flatus but no additional BMs.  She denies any abdominal pain.    NAD, alert and oriented  Normal respiratory effort, no accessory muscle use  Abd soft, NT, ND  Ext no cyanosis or clubbing    WBC 4.8  Cr 0.7    A/P: enteritis vs gastroenteritis    Doing well  Tolerating diet.  Ok for discharge from a surgical standpoint.  Ok to transition to augmentin upon discharge.  Follow up with me as needed.    Alfredo Sarmiento MD          
  BUN 10 8 6*   CREATININE 0.8 0.8 0.7   GLUCOSE 135* 97 77     Hepatic:   Recent Labs     03/16/25  1724 03/18/25  0436   AST 17 15   ALT 15 12   BILITOT 0.6 0.4   ALKPHOS 136* 105     Lipids:   Lab Results   Component Value Date/Time    CHOL 294 10/10/2024 09:15 AM    HDL 62 10/10/2024 09:15 AM    TRIG 210 10/10/2024 09:15 AM     Hemoglobin A1C:   Lab Results   Component Value Date/Time    LABA1C 5.4 10/10/2024 09:15 AM     TSH: No results found for: \"TSH\"  Troponin: No results found for: \"TROPONINT\"  Lactic Acid:   Recent Labs     03/16/25  1724 03/17/25  0813   LACTA 1.3 0.8     BNP: No results for input(s): \"PROBNP\" in the last 72 hours.  UA:  Lab Results   Component Value Date/Time    NITRU Negative 03/16/2025 07:01 PM    COLORU DARK YELLOW 03/16/2025 07:01 PM    PHUR 6.0 03/16/2025 07:01 PM    WBCUA 9 03/16/2025 07:01 PM    RBCUA 2 03/16/2025 07:01 PM    BACTERIA 1+ 03/16/2025 07:01 PM    CLARITYU Clear 03/16/2025 07:01 PM    LEUKOCYTESUR TRACE 03/16/2025 07:01 PM    UROBILINOGEN 1.0 03/16/2025 07:01 PM    BILIRUBINUR Negative 03/16/2025 07:01 PM    BLOODU Negative 03/16/2025 07:01 PM    GLUCOSEU Negative 03/16/2025 07:01 PM    KETUA TRACE 03/16/2025 07:01 PM     Urine Cultures: No results found for: \"LABURIN\"  Blood Cultures: No results found for: \"BC\"  No results found for: \"BLOODCULT2\"  Organism: No results found for: \"ORG\"      Electronically signed by Denzel Dunne DO on 3/18/2025 at 7:41 AM    
Date/Time    CHOL 294 10/10/2024 09:15 AM    HDL 62 10/10/2024 09:15 AM    TRIG 210 10/10/2024 09:15 AM     Hemoglobin A1C:   Lab Results   Component Value Date/Time    LABA1C 5.4 10/10/2024 09:15 AM     TSH: No results found for: \"TSH\"  Troponin: No results found for: \"TROPONINT\"  Lactic Acid:   Recent Labs     03/16/25  1724   LACTA 1.3     BNP: No results for input(s): \"PROBNP\" in the last 72 hours.  UA:  Lab Results   Component Value Date/Time    NITRU Negative 03/16/2025 07:01 PM    COLORU DARK YELLOW 03/16/2025 07:01 PM    PHUR 6.0 03/16/2025 07:01 PM    WBCUA 9 03/16/2025 07:01 PM    RBCUA 2 03/16/2025 07:01 PM    BACTERIA 1+ 03/16/2025 07:01 PM    CLARITYU Clear 03/16/2025 07:01 PM    LEUKOCYTESUR TRACE 03/16/2025 07:01 PM    UROBILINOGEN 1.0 03/16/2025 07:01 PM    BILIRUBINUR Negative 03/16/2025 07:01 PM    BLOODU Negative 03/16/2025 07:01 PM    GLUCOSEU Negative 03/16/2025 07:01 PM    KETUA TRACE 03/16/2025 07:01 PM     Urine Cultures: No results found for: \"LABURIN\"  Blood Cultures: No results found for: \"BC\"  No results found for: \"BLOODCULT2\"  Organism: No results found for: \"ORG\"      Electronically signed by Denzel Dunne DO on 3/17/2025 at 7:24 AM

## 2025-03-19 NOTE — PLAN OF CARE
Problem: Discharge Planning  Goal: Discharge to home or other facility with appropriate resources  Outcome: Completed     Problem: Pain  Goal: Verbalizes/displays adequate comfort level or baseline comfort level  3/19/2025 0918 by Darby Han RN  Outcome: Completed  3/18/2025 2047 by Kayla Lucas RN  Outcome: Progressing     Problem: Safety - Adult  Goal: Free from fall injury  3/19/2025 0918 by Darby Han RN  Outcome: Completed  3/18/2025 2047 by Kayla Lucas RN  Outcome: Progressing     Problem: Neurosensory - Adult  Goal: Achieves stable or improved neurological status  Outcome: Completed     Problem: Respiratory - Adult  Goal: Achieves optimal ventilation and oxygenation  Outcome: Completed     Problem: Skin/Tissue Integrity - Adult  Goal: Skin integrity remains intact  Outcome: Completed     Problem: Musculoskeletal - Adult  Goal: Return mobility to safest level of function  Outcome: Completed     Problem: Gastrointestinal - Adult  Goal: Minimal or absence of nausea and vomiting  Outcome: Completed     Problem: Genitourinary - Adult  Goal: Absence of urinary retention  3/19/2025 0918 by Darby Han RN  Outcome: Completed  3/18/2025 2047 by Kayla Lucas RN  Outcome: Progressing     Problem: Infection - Adult  Goal: Absence of infection at discharge  Outcome: Completed     Problem: Metabolic/Fluid and Electrolytes - Adult  Goal: Electrolytes maintained within normal limits  Outcome: Completed     Problem: Hematologic - Adult  Goal: Maintains hematologic stability  Outcome: Completed

## 2025-03-20 ENCOUNTER — TELEPHONE (OUTPATIENT)
Dept: INTERNAL MEDICINE CLINIC | Age: 35
End: 2025-03-20

## 2025-03-20 NOTE — TELEPHONE ENCOUNTER
Care Transitions Initial Follow Up Call    Outreach made within 2 business days of discharge: Yes    Patient: Kelly Brunner Patient : 1990   MRN: [id number]  Reason for Admission: SBO  Discharge Date: 3/19/25       Spoke with: Patient    Discharge department/facility: [facility] Interactive Patient Contact:  Was patient able to fill all prescriptions: Yes  Was patient instructed to bring all medications to the follow-up visit: Yes  Is patient taking all medications as directed in the discharge summary? Yes  Does patient understand their discharge instructions: Yes  Does patient have questions or concerns that need addressed prior to 7-14 day follow up office visit: no    Additional needs identified to be addressed with provider  No needs identified             Pt scheduled 3/27-first available she can do with her work schedule    Scheduled appointment with PCP within 7-14 days    Follow Up  No future appointments.    Amarilis Long LPN

## 2025-03-26 ENCOUNTER — OFFICE VISIT (OUTPATIENT)
Dept: INTERNAL MEDICINE CLINIC | Age: 35
End: 2025-03-26

## 2025-03-26 VITALS
SYSTOLIC BLOOD PRESSURE: 112 MMHG | BODY MASS INDEX: 44.64 KG/M2 | HEART RATE: 80 BPM | WEIGHT: 242.6 LBS | HEIGHT: 62 IN | OXYGEN SATURATION: 98 % | DIASTOLIC BLOOD PRESSURE: 86 MMHG

## 2025-03-26 DIAGNOSIS — Z09 HOSPITAL DISCHARGE FOLLOW-UP: Primary | ICD-10-CM

## 2025-03-26 DIAGNOSIS — I10 PRIMARY HYPERTENSION: ICD-10-CM

## 2025-03-26 DIAGNOSIS — J45.30 MILD PERSISTENT ASTHMA WITHOUT COMPLICATION: ICD-10-CM

## 2025-03-26 PROBLEM — K56.609 SBO (SMALL BOWEL OBSTRUCTION) (HCC): Status: RESOLVED | Noted: 2025-03-16 | Resolved: 2025-03-26

## 2025-03-26 PROBLEM — N87.1 MODERATE DYSPLASIA OF CERVIX (CIN II): Status: ACTIVE | Noted: 2024-10-23

## 2025-03-26 PROBLEM — J10.1 INFLUENZA A: Status: RESOLVED | Noted: 2025-02-28 | Resolved: 2025-03-26

## 2025-03-26 PROBLEM — K52.9 ENTERITIS: Status: RESOLVED | Noted: 2025-03-17 | Resolved: 2025-03-26

## 2025-03-26 PROBLEM — R05.1 ACUTE COUGH: Status: RESOLVED | Noted: 2025-02-28 | Resolved: 2025-03-26

## 2025-03-26 ASSESSMENT — ENCOUNTER SYMPTOMS
SORE THROAT: 0
VOMITING: 0
COUGH: 0
CHEST TIGHTNESS: 0
BACK PAIN: 0
WHEEZING: 0
NAUSEA: 0
CONSTIPATION: 0
SHORTNESS OF BREATH: 0
COLOR CHANGE: 0
ABDOMINAL PAIN: 0

## 2025-03-26 NOTE — PROGRESS NOTES
ASSESSMENT/PLAN:  1. Hospital discharge follow-up  Assessment & Plan:  Hospital records and discharge summary reviewed and discussed with patient, presumed diagnosis was partial small bowel obstruction however surgery not sure of underlying etiology and probably just had acute enteritis.  Symptoms resolved since, she is done with antibiotic therapy, tolerating p.o. well and denies any pain or discomfort.  Patient did go back to work next day following hospital discharge and will give a note stating she is okay to resume work   Continue holding Adipex for another week or so before restarting  2. Mild persistent asthma without complication  Assessment & Plan:  Continues to be stable and mostly well-controlled with as needed albuterol, continue same, continue to avoid smoke and known irritants   3. Primary hypertension  Assessment & Plan:  Blood pressure stable and well-controlled on current dose of lisinopril, continue same, will reevaluate and update labs in 6 months as she will be due for her annual checkup       Return in about 6 months (around 9/26/2025), or if symptoms worsen or fail to improve, for annual physical.     SUBJECTIVE  HPI:   Here to follow-up on recent hospitalization for acute abdominal pain associated with nausea and vomiting, patient was admitted with possible small bowel obstruction, surgery were consulted, she was treated with IV fluids and IV antibiotics and released home on oral antibiotics.  Symptoms has resolved since, she is done with antibiotics, reports bowel movements been regular since discharge and denies any pain or discomfort        Review of Systems   Constitutional:  Negative for activity change, appetite change and fatigue.   HENT:  Negative for congestion, hearing loss, mouth sores and sore throat.    Respiratory:  Negative for cough, chest tightness, shortness of breath and wheezing.    Cardiovascular:  Negative for chest pain, palpitations and leg swelling.   Gastrointestinal:

## 2025-03-26 NOTE — ASSESSMENT & PLAN NOTE
Hospital records and discharge summary reviewed and discussed with patient, presumed diagnosis was partial small bowel obstruction however surgery not sure of underlying etiology and probably just had acute enteritis.  Symptoms resolved since, she is done with antibiotic therapy, tolerating p.o. well and denies any pain or discomfort.  Patient did go back to work next day following hospital discharge and will give a note stating she is okay to resume work   Continue holding Adipex for another week or so before restarting

## 2025-03-26 NOTE — ASSESSMENT & PLAN NOTE
Continues to be stable and mostly well-controlled with as needed albuterol, continue same, continue to avoid smoke and known irritants

## 2025-03-26 NOTE — ASSESSMENT & PLAN NOTE
Blood pressure stable and well-controlled on current dose of lisinopril, continue same, will reevaluate and update labs in 6 months as she will be due for her annual checkup

## 2025-04-25 PROBLEM — Z09 HOSPITAL DISCHARGE FOLLOW-UP: Status: RESOLVED | Noted: 2025-03-26 | Resolved: 2025-04-25

## 2025-04-28 DIAGNOSIS — E78.2 MIXED HYPERLIPIDEMIA: ICD-10-CM

## 2025-04-29 RX ORDER — ATORVASTATIN CALCIUM 10 MG/1
10 TABLET, FILM COATED ORAL DAILY
Qty: 90 TABLET | Refills: 1 | Status: SHIPPED | OUTPATIENT
Start: 2025-04-29

## 2025-04-29 NOTE — TELEPHONE ENCOUNTER
Last OV: 3/26/2025  Next OV: Visit date not found    Next appointment due: 9/26/2025    Last fill: 10/15/2024  Refills: 1